# Patient Record
Sex: FEMALE | Race: WHITE | Employment: UNEMPLOYED | URBAN - METROPOLITAN AREA
[De-identification: names, ages, dates, MRNs, and addresses within clinical notes are randomized per-mention and may not be internally consistent; named-entity substitution may affect disease eponyms.]

---

## 2017-02-21 ENCOUNTER — HOSPITAL ENCOUNTER (OUTPATIENT)
Dept: GENERAL RADIOLOGY | Age: 82
Discharge: HOME | End: 2017-02-21

## 2017-02-21 DIAGNOSIS — R10.9 ABDOMINAL PAIN, UNSPECIFIED SITE: ICD-10-CM

## 2018-03-13 ENCOUNTER — HOSPITAL ENCOUNTER (OUTPATIENT)
Dept: MAMMOGRAPHY | Age: 83
Discharge: HOME | End: 2018-03-13
Admitting: INTERNAL MEDICINE

## 2018-03-13 DIAGNOSIS — M81.0 MENOPAUSAL OSTEOPOROSIS: ICD-10-CM

## 2018-08-13 ENCOUNTER — HOSPITAL ENCOUNTER (OUTPATIENT)
Age: 83
Discharge: HOME | End: 2018-08-13
Payer: MEDICARE

## 2018-08-13 ENCOUNTER — HOSPITAL ENCOUNTER (OUTPATIENT)
Dept: GENERAL RADIOLOGY | Age: 83
Discharge: HOME | End: 2018-08-13
Payer: MEDICARE

## 2018-08-13 DIAGNOSIS — M16.0 PRIMARY OSTEOARTHRITIS OF BOTH HIPS: ICD-10-CM

## 2018-08-13 LAB
C-REACTIVE PROTEIN: <0.3 MG/L (ref 0–5.1)
HCT VFR BLD CALC: 40.7 % (ref 36–48)
HEMOGLOBIN: 13.5 G/DL (ref 12–16)
MCH RBC QN AUTO: 33.3 PG (ref 26–34)
MCHC RBC AUTO-ENTMCNC: 33.2 G/DL (ref 31–36)
MCV RBC AUTO: 100.4 FL (ref 80–100)
PDW BLD-RTO: 14.3 % (ref 12.4–15.4)
PLATELET # BLD: 195 K/UL (ref 135–450)
PMV BLD AUTO: 10.6 FL (ref 5–10.5)
RBC # BLD: 4.06 M/UL (ref 4–5.2)
SEDIMENTATION RATE, ERYTHROCYTE: 8 MM/HR (ref 0–30)
WBC # BLD: 6.2 K/UL (ref 4–11)

## 2018-08-13 PROCEDURE — 36415 COLL VENOUS BLD VENIPUNCTURE: CPT

## 2018-08-13 PROCEDURE — 86140 C-REACTIVE PROTEIN: CPT

## 2018-08-13 PROCEDURE — 85652 RBC SED RATE AUTOMATED: CPT

## 2018-08-13 PROCEDURE — 73522 X-RAY EXAM HIPS BI 3-4 VIEWS: CPT

## 2018-08-13 PROCEDURE — 85027 COMPLETE CBC AUTOMATED: CPT

## 2018-11-20 ENCOUNTER — HOSPITAL ENCOUNTER (OUTPATIENT)
Dept: GENERAL RADIOLOGY | Age: 83
Discharge: HOME | End: 2018-11-20
Payer: MEDICARE

## 2018-11-20 ENCOUNTER — HOSPITAL ENCOUNTER (OUTPATIENT)
Age: 83
Discharge: HOME | End: 2018-11-20
Payer: MEDICARE

## 2018-11-20 DIAGNOSIS — K91.81 INTRAOPERATIVE PARTIAL INTESTINAL OBSTRUCTION (HCC): ICD-10-CM

## 2018-11-20 DIAGNOSIS — K56.690 INTRAOPERATIVE PARTIAL INTESTINAL OBSTRUCTION (HCC): ICD-10-CM

## 2018-11-20 LAB
ANION GAP SERPL CALCULATED.3IONS-SCNC: 12 MMOL/L (ref 3–16)
BACTERIA: ABNORMAL /HPF
BASOPHILS ABSOLUTE: 0 K/UL (ref 0–0.2)
BASOPHILS RELATIVE PERCENT: 0.3 %
BILIRUBIN URINE: NEGATIVE
BLOOD, URINE: ABNORMAL
BUN BLDV-MCNC: 15 MG/DL (ref 7–20)
CALCIUM SERPL-MCNC: 9.2 MG/DL (ref 8.3–10.6)
CHLORIDE BLD-SCNC: 103 MMOL/L (ref 99–110)
CLARITY: CLEAR
CO2: 26 MMOL/L (ref 21–32)
COLOR: YELLOW
CREAT SERPL-MCNC: 0.8 MG/DL (ref 0.6–1.2)
EOSINOPHILS ABSOLUTE: 0 K/UL (ref 0–0.6)
EOSINOPHILS RELATIVE PERCENT: 0.1 %
EPITHELIAL CELLS, UA: ABNORMAL /HPF
GFR AFRICAN AMERICAN: >60
GFR NON-AFRICAN AMERICAN: >60
GLUCOSE BLD-MCNC: 124 MG/DL (ref 70–99)
GLUCOSE URINE: NEGATIVE MG/DL
HCT VFR BLD CALC: 41.7 % (ref 36–48)
HEMOGLOBIN: 13.9 G/DL (ref 12–16)
KETONES, URINE: 15 MG/DL
LEUKOCYTE ESTERASE, URINE: NEGATIVE
LYMPHOCYTES ABSOLUTE: 0.8 K/UL (ref 1–5.1)
LYMPHOCYTES RELATIVE PERCENT: 10.4 %
MCH RBC QN AUTO: 33.2 PG (ref 26–34)
MCHC RBC AUTO-ENTMCNC: 33.3 G/DL (ref 31–36)
MCV RBC AUTO: 99.7 FL (ref 80–100)
MICROSCOPIC EXAMINATION: YES
MONOCYTES ABSOLUTE: 0.3 K/UL (ref 0–1.3)
MONOCYTES RELATIVE PERCENT: 4.3 %
MUCUS: ABNORMAL /LPF
NEUTROPHILS ABSOLUTE: 6.4 K/UL (ref 1.7–7.7)
NEUTROPHILS RELATIVE PERCENT: 84.9 %
NITRITE, URINE: NEGATIVE
PDW BLD-RTO: 13.3 % (ref 12.4–15.4)
PH UA: 7
PLATELET # BLD: 223 K/UL (ref 135–450)
PMV BLD AUTO: 10.8 FL (ref 5–10.5)
POTASSIUM SERPL-SCNC: 4.7 MMOL/L (ref 3.5–5.1)
PROTEIN UA: ABNORMAL MG/DL
RBC # BLD: 4.18 M/UL (ref 4–5.2)
RBC UA: ABNORMAL /HPF (ref 0–2)
SODIUM BLD-SCNC: 141 MMOL/L (ref 136–145)
SPECIFIC GRAVITY UA: 1.02
URINE TYPE: ABNORMAL
UROBILINOGEN, URINE: 0.2 E.U./DL
WBC # BLD: 7.6 K/UL (ref 4–11)
WBC UA: ABNORMAL /HPF (ref 0–5)

## 2018-11-20 PROCEDURE — 74018 RADEX ABDOMEN 1 VIEW: CPT

## 2018-11-20 PROCEDURE — 81001 URINALYSIS AUTO W/SCOPE: CPT

## 2018-11-20 PROCEDURE — 80048 BASIC METABOLIC PNL TOTAL CA: CPT

## 2018-11-20 PROCEDURE — 87086 URINE CULTURE/COLONY COUNT: CPT

## 2018-11-20 PROCEDURE — 85025 COMPLETE CBC W/AUTO DIFF WBC: CPT

## 2018-11-20 PROCEDURE — 71046 X-RAY EXAM CHEST 2 VIEWS: CPT

## 2018-11-20 PROCEDURE — 36415 COLL VENOUS BLD VENIPUNCTURE: CPT

## 2018-11-22 LAB — URINE CULTURE, ROUTINE: NORMAL

## 2019-07-25 ENCOUNTER — OFFICE VISIT (OUTPATIENT)
Dept: ORTHOPEDIC SURGERY | Age: 84
End: 2019-07-25
Payer: MEDICARE

## 2019-07-25 DIAGNOSIS — M25.561 RIGHT KNEE PAIN, UNSPECIFIED CHRONICITY: Primary | ICD-10-CM

## 2019-07-25 DIAGNOSIS — S82.034A CLOSED NONDISPLACED TRANSVERSE FRACTURE OF RIGHT PATELLA, INITIAL ENCOUNTER: ICD-10-CM

## 2019-07-25 PROCEDURE — G8427 DOCREV CUR MEDS BY ELIG CLIN: HCPCS | Performed by: ORTHOPAEDIC SURGERY

## 2019-07-25 PROCEDURE — 4040F PNEUMOC VAC/ADMIN/RCVD: CPT | Performed by: ORTHOPAEDIC SURGERY

## 2019-07-25 PROCEDURE — G8421 BMI NOT CALCULATED: HCPCS | Performed by: ORTHOPAEDIC SURGERY

## 2019-07-25 PROCEDURE — 1123F ACP DISCUSS/DSCN MKR DOCD: CPT | Performed by: ORTHOPAEDIC SURGERY

## 2019-07-25 PROCEDURE — 1090F PRES/ABSN URINE INCON ASSESS: CPT | Performed by: ORTHOPAEDIC SURGERY

## 2019-07-25 PROCEDURE — L1832 KO ADJ JNT POS R SUP PRE CST: HCPCS | Performed by: ORTHOPAEDIC SURGERY

## 2019-07-25 PROCEDURE — 1036F TOBACCO NON-USER: CPT | Performed by: ORTHOPAEDIC SURGERY

## 2019-07-25 PROCEDURE — 99203 OFFICE O/P NEW LOW 30 MIN: CPT | Performed by: ORTHOPAEDIC SURGERY

## 2019-07-25 NOTE — PROGRESS NOTES
Fear of current or ex partner: None     Emotionally abused: None     Physically abused: None     Forced sexual activity: None   Other Topics Concern    None   Social History Narrative    None     No current outpatient medications on file. No current facility-administered medications for this visit. No Known Allergies    Review of Systems:  Constitutional: negative  Respiratory: negative  Cardiovascular: negative  Musculoskeletal:negative except for New Patient (R KNEE )    Relevant review of systems reviewed and available in the patient's chart in media tab    General Exam:   Constitutional: Patient is adequately groomed with no evidence of malnutrition  Mental Status: The patient is oriented to time, place and person. The patient's mood and affect are appropriate. Vascular: Examination reveals no swelling or calf tenderness. Peripheral pulses are palpable and 2+. OBJECTIVE:  Vital Signs: There were no vitals filed for this visit. Appearance: alert, well appearing, and in no distress, oriented to person, place, and time and normal appearing weight. Physical exam:   Erythema noted to the anterior aspect of right knee from recent fall, tenderness to palpation over the patella. Patient tolerates range of motion actively 0 to 90 degrees, she is able to perform a straight leg raise, extensor mechanism is intact. Special test are deferred. Patient is ambulating with a walker. Distal neurovascular exam is intact. X-ray: 4 views of the right knee are obtained and reviewed they show nondisplaced transverse patella fracture    Assessment : Right nondisplaced transverse patella fracture    Impression:  Encounter Diagnoses   Name Primary?     Right knee pain, unspecified chronicity Yes    Closed nondisplaced transverse fracture of right patella, initial encounter        Office Procedures:  Orders Placed This Encounter   Procedures    XR KNEE RIGHT (MIN 4 VIEWS)    Shanitag T Scope Knee Brace

## 2019-07-26 ENCOUNTER — TELEPHONE (OUTPATIENT)
Dept: ORTHOPEDIC SURGERY | Age: 84
End: 2019-07-26

## 2019-08-22 ENCOUNTER — OFFICE VISIT (OUTPATIENT)
Dept: ORTHOPEDIC SURGERY | Age: 84
End: 2019-08-22
Payer: MEDICARE

## 2019-08-22 VITALS — BODY MASS INDEX: 21.16 KG/M2 | HEIGHT: 62 IN | WEIGHT: 115 LBS

## 2019-08-22 DIAGNOSIS — M25.561 RIGHT KNEE PAIN, UNSPECIFIED CHRONICITY: Primary | ICD-10-CM

## 2019-08-22 DIAGNOSIS — S82.034A CLOSED NONDISPLACED TRANSVERSE FRACTURE OF RIGHT PATELLA, INITIAL ENCOUNTER: ICD-10-CM

## 2019-08-22 PROCEDURE — 99213 OFFICE O/P EST LOW 20 MIN: CPT | Performed by: ORTHOPAEDIC SURGERY

## 2019-08-22 PROCEDURE — G8427 DOCREV CUR MEDS BY ELIG CLIN: HCPCS | Performed by: ORTHOPAEDIC SURGERY

## 2019-08-22 PROCEDURE — 1090F PRES/ABSN URINE INCON ASSESS: CPT | Performed by: ORTHOPAEDIC SURGERY

## 2019-08-22 PROCEDURE — 1123F ACP DISCUSS/DSCN MKR DOCD: CPT | Performed by: ORTHOPAEDIC SURGERY

## 2019-08-22 PROCEDURE — G8420 CALC BMI NORM PARAMETERS: HCPCS | Performed by: ORTHOPAEDIC SURGERY

## 2019-08-22 PROCEDURE — 1036F TOBACCO NON-USER: CPT | Performed by: ORTHOPAEDIC SURGERY

## 2019-08-22 PROCEDURE — 4040F PNEUMOC VAC/ADMIN/RCVD: CPT | Performed by: ORTHOPAEDIC SURGERY

## 2019-09-19 ENCOUNTER — OFFICE VISIT (OUTPATIENT)
Dept: ORTHOPEDIC SURGERY | Age: 84
End: 2019-09-19
Payer: MEDICARE

## 2019-09-19 VITALS — HEIGHT: 62 IN | WEIGHT: 115.08 LBS | BODY MASS INDEX: 21.18 KG/M2

## 2019-09-19 DIAGNOSIS — M25.561 RIGHT KNEE PAIN, UNSPECIFIED CHRONICITY: Primary | ICD-10-CM

## 2019-09-19 DIAGNOSIS — S82.034A CLOSED NONDISPLACED TRANSVERSE FRACTURE OF RIGHT PATELLA, INITIAL ENCOUNTER: ICD-10-CM

## 2019-09-19 PROCEDURE — 4040F PNEUMOC VAC/ADMIN/RCVD: CPT | Performed by: ORTHOPAEDIC SURGERY

## 2019-09-19 PROCEDURE — 1123F ACP DISCUSS/DSCN MKR DOCD: CPT | Performed by: ORTHOPAEDIC SURGERY

## 2019-09-19 PROCEDURE — G8428 CUR MEDS NOT DOCUMENT: HCPCS | Performed by: ORTHOPAEDIC SURGERY

## 2019-09-19 PROCEDURE — 1036F TOBACCO NON-USER: CPT | Performed by: ORTHOPAEDIC SURGERY

## 2019-09-19 PROCEDURE — 1090F PRES/ABSN URINE INCON ASSESS: CPT | Performed by: ORTHOPAEDIC SURGERY

## 2019-09-19 PROCEDURE — G8420 CALC BMI NORM PARAMETERS: HCPCS | Performed by: ORTHOPAEDIC SURGERY

## 2019-09-19 PROCEDURE — 99213 OFFICE O/P EST LOW 20 MIN: CPT | Performed by: ORTHOPAEDIC SURGERY

## 2020-05-01 ENCOUNTER — APPOINTMENT (OUTPATIENT)
Dept: GENERAL RADIOLOGY | Age: 85
DRG: 057 | End: 2020-05-01
Payer: MEDICARE

## 2020-05-01 ENCOUNTER — HOSPITAL ENCOUNTER (EMERGENCY)
Age: 85
Discharge: NURSING, REHAB OR LONG-TERM CARE | DRG: 057 | End: 2020-05-01
Attending: EMERGENCY MEDICINE
Payer: MEDICARE

## 2020-05-01 VITALS
HEART RATE: 63 BPM | SYSTOLIC BLOOD PRESSURE: 157 MMHG | HEIGHT: 64 IN | WEIGHT: 120 LBS | DIASTOLIC BLOOD PRESSURE: 70 MMHG | RESPIRATION RATE: 16 BRPM | TEMPERATURE: 97.8 F | OXYGEN SATURATION: 96 % | BODY MASS INDEX: 20.49 KG/M2

## 2020-05-01 LAB
A/G RATIO: 1.4 (ref 1.1–2.2)
ACETAMINOPHEN LEVEL: <5 UG/ML (ref 10–30)
ALBUMIN SERPL-MCNC: 3.7 G/DL (ref 3.4–5)
ALP BLD-CCNC: 78 U/L (ref 40–129)
ALT SERPL-CCNC: 18 U/L (ref 10–40)
AMPHETAMINE SCREEN, URINE: NORMAL
ANION GAP SERPL CALCULATED.3IONS-SCNC: 11 MMOL/L (ref 3–16)
AST SERPL-CCNC: 23 U/L (ref 15–37)
BACTERIA: ABNORMAL /HPF
BARBITURATE SCREEN URINE: NORMAL
BASOPHILS ABSOLUTE: 0 K/UL (ref 0–0.2)
BASOPHILS RELATIVE PERCENT: 0.6 %
BENZODIAZEPINE SCREEN, URINE: NORMAL
BILIRUB SERPL-MCNC: <0.2 MG/DL (ref 0–1)
BILIRUBIN URINE: NEGATIVE
BLOOD, URINE: ABNORMAL
BUN BLDV-MCNC: 22 MG/DL (ref 7–20)
CALCIUM SERPL-MCNC: 9.1 MG/DL (ref 8.3–10.6)
CANNABINOID SCREEN URINE: NORMAL
CHLORIDE BLD-SCNC: 103 MMOL/L (ref 99–110)
CLARITY: ABNORMAL
CO2: 23 MMOL/L (ref 21–32)
COCAINE METABOLITE SCREEN URINE: NORMAL
COLOR: YELLOW
CREAT SERPL-MCNC: 1 MG/DL (ref 0.6–1.2)
EOSINOPHILS ABSOLUTE: 0.1 K/UL (ref 0–0.6)
EOSINOPHILS RELATIVE PERCENT: 1.1 %
GFR AFRICAN AMERICAN: >60
GFR NON-AFRICAN AMERICAN: 52
GLOBULIN: 2.6 G/DL
GLUCOSE BLD-MCNC: 99 MG/DL (ref 70–99)
GLUCOSE URINE: NEGATIVE MG/DL
HCT VFR BLD CALC: 36.6 % (ref 36–48)
HEMOGLOBIN: 12.2 G/DL (ref 12–16)
KETONES, URINE: NEGATIVE MG/DL
LEUKOCYTE ESTERASE, URINE: ABNORMAL
LYMPHOCYTES ABSOLUTE: 0.8 K/UL (ref 1–5.1)
LYMPHOCYTES RELATIVE PERCENT: 16.1 %
Lab: NORMAL
MCH RBC QN AUTO: 32.9 PG (ref 26–34)
MCHC RBC AUTO-ENTMCNC: 33.5 G/DL (ref 31–36)
MCV RBC AUTO: 98.4 FL (ref 80–100)
METHADONE SCREEN, URINE: NORMAL
MICROSCOPIC EXAMINATION: YES
MONOCYTES ABSOLUTE: 0.6 K/UL (ref 0–1.3)
MONOCYTES RELATIVE PERCENT: 11.6 %
MUCUS: ABNORMAL /LPF
NEUTROPHILS ABSOLUTE: 3.6 K/UL (ref 1.7–7.7)
NEUTROPHILS RELATIVE PERCENT: 70.6 %
NITRITE, URINE: NEGATIVE
OPIATE SCREEN URINE: NORMAL
OXYCODONE URINE: NORMAL
PDW BLD-RTO: 13.8 % (ref 12.4–15.4)
PH UA: 6
PH UA: 6 (ref 5–8)
PHENCYCLIDINE SCREEN URINE: NORMAL
PLATELET # BLD: 178 K/UL (ref 135–450)
PMV BLD AUTO: 9.6 FL (ref 5–10.5)
POTASSIUM REFLEX MAGNESIUM: 4.1 MMOL/L (ref 3.5–5.1)
PROPOXYPHENE SCREEN: NORMAL
PROTEIN UA: NEGATIVE MG/DL
RBC # BLD: 3.71 M/UL (ref 4–5.2)
RBC UA: ABNORMAL /HPF (ref 0–4)
SALICYLATE, SERUM: <0.3 MG/DL (ref 15–30)
SODIUM BLD-SCNC: 137 MMOL/L (ref 136–145)
SPECIFIC GRAVITY UA: 1.01 (ref 1–1.03)
TOTAL PROTEIN: 6.3 G/DL (ref 6.4–8.2)
URINE TYPE: ABNORMAL
UROBILINOGEN, URINE: 0.2 E.U./DL
WBC # BLD: 5.1 K/UL (ref 4–11)
WBC UA: ABNORMAL /HPF (ref 0–5)

## 2020-05-01 PROCEDURE — 80307 DRUG TEST PRSMV CHEM ANLYZR: CPT

## 2020-05-01 PROCEDURE — 71046 X-RAY EXAM CHEST 2 VIEWS: CPT

## 2020-05-01 PROCEDURE — 81001 URINALYSIS AUTO W/SCOPE: CPT

## 2020-05-01 PROCEDURE — 80053 COMPREHEN METABOLIC PANEL: CPT

## 2020-05-01 PROCEDURE — G0480 DRUG TEST DEF 1-7 CLASSES: HCPCS

## 2020-05-01 PROCEDURE — 2580000003 HC RX 258: Performed by: PHYSICIAN ASSISTANT

## 2020-05-01 PROCEDURE — 87077 CULTURE AEROBIC IDENTIFY: CPT

## 2020-05-01 PROCEDURE — 99285 EMERGENCY DEPT VISIT HI MDM: CPT

## 2020-05-01 PROCEDURE — 87086 URINE CULTURE/COLONY COUNT: CPT

## 2020-05-01 PROCEDURE — 85025 COMPLETE CBC W/AUTO DIFF WBC: CPT

## 2020-05-01 PROCEDURE — 96365 THER/PROPH/DIAG IV INF INIT: CPT

## 2020-05-01 PROCEDURE — 6360000002 HC RX W HCPCS: Performed by: PHYSICIAN ASSISTANT

## 2020-05-01 RX ORDER — RISPERIDONE 0.5 MG/1
0.5 TABLET, FILM COATED ORAL 2 TIMES DAILY
Status: ON HOLD | COMMUNITY
End: 2020-05-15 | Stop reason: SDUPTHER

## 2020-05-01 RX ORDER — IBANDRONATE SODIUM 150 MG/1
150 TABLET, FILM COATED ORAL
COMMUNITY

## 2020-05-01 RX ORDER — CEPHALEXIN 500 MG/1
500 CAPSULE ORAL 2 TIMES DAILY
Qty: 14 CAPSULE | Refills: 0 | Status: ON HOLD | OUTPATIENT
Start: 2020-05-01 | End: 2020-05-15 | Stop reason: HOSPADM

## 2020-05-01 RX ORDER — LORAZEPAM 0.5 MG/1
0.5 TABLET ORAL 2 TIMES DAILY
Status: ON HOLD | COMMUNITY
End: 2020-05-15 | Stop reason: HOSPADM

## 2020-05-01 RX ORDER — LEVOTHYROXINE SODIUM 0.05 MG/1
50 TABLET ORAL DAILY
COMMUNITY

## 2020-05-01 RX ORDER — ESCITALOPRAM OXALATE 10 MG/1
10 TABLET ORAL DAILY
COMMUNITY

## 2020-05-01 RX ORDER — OYSTER SHELL CALCIUM WITH VITAMIN D 500; 200 MG/1; [IU]/1
1 TABLET, FILM COATED ORAL DAILY
COMMUNITY

## 2020-05-01 RX ORDER — SERTRALINE HYDROCHLORIDE 100 MG/1
100 TABLET, FILM COATED ORAL DAILY
Status: ON HOLD | COMMUNITY
End: 2020-05-15 | Stop reason: HOSPADM

## 2020-05-01 RX ORDER — ACETAMINOPHEN 325 MG/1
650 TABLET ORAL EVERY 4 HOURS PRN
COMMUNITY

## 2020-05-01 RX ADMIN — CEFTRIAXONE SODIUM 1 G: 1 INJECTION, POWDER, FOR SOLUTION INTRAMUSCULAR; INTRAVENOUS at 21:18

## 2020-05-02 ENCOUNTER — HOSPITAL ENCOUNTER (INPATIENT)
Age: 85
LOS: 1 days | Discharge: ANOTHER HOSPITAL | DRG: 057 | End: 2020-05-03
Attending: EMERGENCY MEDICINE | Admitting: PSYCHIATRY & NEUROLOGY
Payer: MEDICARE

## 2020-05-02 ENCOUNTER — APPOINTMENT (OUTPATIENT)
Dept: GENERAL RADIOLOGY | Age: 85
DRG: 057 | End: 2020-05-02
Payer: MEDICARE

## 2020-05-02 ENCOUNTER — APPOINTMENT (OUTPATIENT)
Dept: CT IMAGING | Age: 85
DRG: 057 | End: 2020-05-02
Payer: MEDICARE

## 2020-05-02 PROBLEM — F03.91 UNSPECIFIED DEMENTIA WITH BEHAVIORAL DISTURBANCE: Status: ACTIVE | Noted: 2020-05-02

## 2020-05-02 LAB
A/G RATIO: 1.4 (ref 1.1–2.2)
ALBUMIN SERPL-MCNC: 4.1 G/DL (ref 3.4–5)
ALP BLD-CCNC: 74 U/L (ref 40–129)
ALT SERPL-CCNC: 17 U/L (ref 10–40)
AMPHETAMINE SCREEN, URINE: NORMAL
ANION GAP SERPL CALCULATED.3IONS-SCNC: 9 MMOL/L (ref 3–16)
AST SERPL-CCNC: 24 U/L (ref 15–37)
BARBITURATE SCREEN URINE: NORMAL
BASOPHILS ABSOLUTE: 0 K/UL (ref 0–0.2)
BASOPHILS RELATIVE PERCENT: 0.6 %
BENZODIAZEPINE SCREEN, URINE: NORMAL
BILIRUB SERPL-MCNC: 0.4 MG/DL (ref 0–1)
BILIRUBIN URINE: NEGATIVE
BLOOD, URINE: ABNORMAL
BUN BLDV-MCNC: 15 MG/DL (ref 7–20)
CALCIUM SERPL-MCNC: 9.4 MG/DL (ref 8.3–10.6)
CANNABINOID SCREEN URINE: NORMAL
CHLORIDE BLD-SCNC: 103 MMOL/L (ref 99–110)
CLARITY: CLEAR
CO2: 27 MMOL/L (ref 21–32)
COCAINE METABOLITE SCREEN URINE: NORMAL
COLOR: YELLOW
CREAT SERPL-MCNC: 0.9 MG/DL (ref 0.6–1.2)
EKG ATRIAL RATE: 60 BPM
EKG DIAGNOSIS: NORMAL
EKG P AXIS: 70 DEGREES
EKG P-R INTERVAL: 154 MS
EKG Q-T INTERVAL: 404 MS
EKG QRS DURATION: 72 MS
EKG QTC CALCULATION (BAZETT): 404 MS
EKG R AXIS: 33 DEGREES
EKG T AXIS: 71 DEGREES
EKG VENTRICULAR RATE: 60 BPM
EOSINOPHILS ABSOLUTE: 0 K/UL (ref 0–0.6)
EOSINOPHILS RELATIVE PERCENT: 1 %
EPITHELIAL CELLS, UA: ABNORMAL /HPF (ref 0–5)
ETHANOL: NORMAL MG/DL (ref 0–0.08)
GFR AFRICAN AMERICAN: >60
GFR NON-AFRICAN AMERICAN: 59
GLOBULIN: 3 G/DL
GLUCOSE BLD-MCNC: 97 MG/DL (ref 70–99)
GLUCOSE URINE: NEGATIVE MG/DL
HCT VFR BLD CALC: 40.7 % (ref 36–48)
HEMOGLOBIN: 13.5 G/DL (ref 12–16)
KETONES, URINE: NEGATIVE MG/DL
LEUKOCYTE ESTERASE, URINE: NEGATIVE
LYMPHOCYTES ABSOLUTE: 1 K/UL (ref 1–5.1)
LYMPHOCYTES RELATIVE PERCENT: 20.6 %
Lab: NORMAL
MCH RBC QN AUTO: 32.4 PG (ref 26–34)
MCHC RBC AUTO-ENTMCNC: 33.3 G/DL (ref 31–36)
MCV RBC AUTO: 97.2 FL (ref 80–100)
METHADONE SCREEN, URINE: NORMAL
MICROSCOPIC EXAMINATION: YES
MONOCYTES ABSOLUTE: 0.5 K/UL (ref 0–1.3)
MONOCYTES RELATIVE PERCENT: 11.1 %
MUCUS: ABNORMAL /LPF
NEUTROPHILS ABSOLUTE: 3.1 K/UL (ref 1.7–7.7)
NEUTROPHILS RELATIVE PERCENT: 66.7 %
NITRITE, URINE: NEGATIVE
OPIATE SCREEN URINE: NORMAL
OXYCODONE URINE: NORMAL
PDW BLD-RTO: 13.7 % (ref 12.4–15.4)
PH UA: 6
PH UA: 6 (ref 5–8)
PHENCYCLIDINE SCREEN URINE: NORMAL
PLATELET # BLD: 188 K/UL (ref 135–450)
PMV BLD AUTO: 10.2 FL (ref 5–10.5)
POTASSIUM REFLEX MAGNESIUM: 4.2 MMOL/L (ref 3.5–5.1)
PROPOXYPHENE SCREEN: NORMAL
PROTEIN UA: NEGATIVE MG/DL
RBC # BLD: 4.18 M/UL (ref 4–5.2)
RBC UA: ABNORMAL /HPF (ref 0–4)
SODIUM BLD-SCNC: 139 MMOL/L (ref 136–145)
SPECIFIC GRAVITY UA: 1.02 (ref 1–1.03)
T4 FREE: 1.3 NG/DL (ref 0.9–1.8)
TOTAL PROTEIN: 7.1 G/DL (ref 6.4–8.2)
TROPONIN: <0.01 NG/ML
TSH REFLEX: 6.29 UIU/ML (ref 0.27–4.2)
URINE REFLEX TO CULTURE: ABNORMAL
URINE TYPE: ABNORMAL
UROBILINOGEN, URINE: 0.2 E.U./DL
WBC # BLD: 4.7 K/UL (ref 4–11)
WBC UA: ABNORMAL /HPF (ref 0–5)

## 2020-05-02 PROCEDURE — G0480 DRUG TEST DEF 1-7 CLASSES: HCPCS

## 2020-05-02 PROCEDURE — 93005 ELECTROCARDIOGRAM TRACING: CPT | Performed by: EMERGENCY MEDICINE

## 2020-05-02 PROCEDURE — 84443 ASSAY THYROID STIM HORMONE: CPT

## 2020-05-02 PROCEDURE — 6370000000 HC RX 637 (ALT 250 FOR IP): Performed by: INTERNAL MEDICINE

## 2020-05-02 PROCEDURE — 80307 DRUG TEST PRSMV CHEM ANLYZR: CPT

## 2020-05-02 PROCEDURE — 80053 COMPREHEN METABOLIC PANEL: CPT

## 2020-05-02 PROCEDURE — 81001 URINALYSIS AUTO W/SCOPE: CPT

## 2020-05-02 PROCEDURE — 85025 COMPLETE CBC W/AUTO DIFF WBC: CPT

## 2020-05-02 PROCEDURE — 70450 CT HEAD/BRAIN W/O DYE: CPT

## 2020-05-02 PROCEDURE — 6360000002 HC RX W HCPCS: Performed by: EMERGENCY MEDICINE

## 2020-05-02 PROCEDURE — 84484 ASSAY OF TROPONIN QUANT: CPT

## 2020-05-02 PROCEDURE — 99285 EMERGENCY DEPT VISIT HI MDM: CPT

## 2020-05-02 PROCEDURE — 93010 ELECTROCARDIOGRAM REPORT: CPT | Performed by: INTERNAL MEDICINE

## 2020-05-02 PROCEDURE — 84439 ASSAY OF FREE THYROXINE: CPT

## 2020-05-02 PROCEDURE — 1240000000 HC EMOTIONAL WELLNESS R&B

## 2020-05-02 PROCEDURE — 71045 X-RAY EXAM CHEST 1 VIEW: CPT

## 2020-05-02 RX ORDER — HALOPERIDOL 5 MG/ML
5 INJECTION INTRAMUSCULAR ONCE
Status: COMPLETED | OUTPATIENT
Start: 2020-05-02 | End: 2020-05-02

## 2020-05-02 RX ORDER — M-VIT,TX,IRON,MINS/CALC/FOLIC 27MG-0.4MG
1 TABLET ORAL DAILY
Status: DISCONTINUED | OUTPATIENT
Start: 2020-05-03 | End: 2020-05-03 | Stop reason: HOSPADM

## 2020-05-02 RX ORDER — ACETAMINOPHEN 325 MG/1
650 TABLET ORAL EVERY 4 HOURS PRN
Status: DISCONTINUED | OUTPATIENT
Start: 2020-05-02 | End: 2020-05-03 | Stop reason: HOSPADM

## 2020-05-02 RX ORDER — MAGNESIUM HYDROXIDE/ALUMINUM HYDROXICE/SIMETHICONE 120; 1200; 1200 MG/30ML; MG/30ML; MG/30ML
30 SUSPENSION ORAL EVERY 6 HOURS PRN
Status: DISCONTINUED | OUTPATIENT
Start: 2020-05-02 | End: 2020-05-03 | Stop reason: HOSPADM

## 2020-05-02 RX ORDER — HYDRALAZINE HYDROCHLORIDE 25 MG/1
25 TABLET, FILM COATED ORAL EVERY 6 HOURS PRN
Status: DISCONTINUED | OUTPATIENT
Start: 2020-05-02 | End: 2020-05-02

## 2020-05-02 RX ORDER — CLONIDINE HYDROCHLORIDE 0.1 MG/1
0.1 TABLET ORAL EVERY 4 HOURS PRN
Status: DISCONTINUED | OUTPATIENT
Start: 2020-05-02 | End: 2020-05-02

## 2020-05-02 RX ORDER — ESCITALOPRAM OXALATE 10 MG/1
10 TABLET ORAL DAILY
Status: DISCONTINUED | OUTPATIENT
Start: 2020-05-03 | End: 2020-05-03 | Stop reason: HOSPADM

## 2020-05-02 RX ORDER — HALOPERIDOL 5 MG/ML
2 INJECTION INTRAMUSCULAR EVERY 6 HOURS PRN
Status: DISCONTINUED | OUTPATIENT
Start: 2020-05-02 | End: 2020-05-03 | Stop reason: HOSPADM

## 2020-05-02 RX ORDER — RISPERIDONE 0.25 MG/1
0.5 TABLET, FILM COATED ORAL 2 TIMES DAILY
Status: DISCONTINUED | OUTPATIENT
Start: 2020-05-02 | End: 2020-05-03 | Stop reason: HOSPADM

## 2020-05-02 RX ORDER — HALOPERIDOL 1 MG/1
2 TABLET ORAL EVERY 6 HOURS PRN
Status: DISCONTINUED | OUTPATIENT
Start: 2020-05-02 | End: 2020-05-03 | Stop reason: HOSPADM

## 2020-05-02 RX ORDER — LORAZEPAM 2 MG/ML
1 INJECTION INTRAMUSCULAR EVERY 6 HOURS PRN
Status: DISCONTINUED | OUTPATIENT
Start: 2020-05-02 | End: 2020-05-03 | Stop reason: HOSPADM

## 2020-05-02 RX ORDER — TRAZODONE HYDROCHLORIDE 50 MG/1
25 TABLET ORAL NIGHTLY PRN
Status: DISCONTINUED | OUTPATIENT
Start: 2020-05-02 | End: 2020-05-03 | Stop reason: HOSPADM

## 2020-05-02 RX ORDER — LEVOTHYROXINE SODIUM 0.05 MG/1
50 TABLET ORAL DAILY
Status: DISCONTINUED | OUTPATIENT
Start: 2020-05-03 | End: 2020-05-03 | Stop reason: HOSPADM

## 2020-05-02 RX ORDER — DIPHENHYDRAMINE HYDROCHLORIDE 50 MG/ML
25 INJECTION INTRAMUSCULAR; INTRAVENOUS ONCE
Status: COMPLETED | OUTPATIENT
Start: 2020-05-02 | End: 2020-05-02

## 2020-05-02 RX ORDER — MULTIVIT,IRON,MINERALS/LUTEIN
1 TABLET ORAL DAILY
Status: DISCONTINUED | OUTPATIENT
Start: 2020-05-03 | End: 2020-05-02 | Stop reason: CLARIF

## 2020-05-02 RX ORDER — BENZTROPINE MESYLATE 1 MG/ML
1 INJECTION INTRAMUSCULAR; INTRAVENOUS 2 TIMES DAILY PRN
Status: DISCONTINUED | OUTPATIENT
Start: 2020-05-02 | End: 2020-05-03 | Stop reason: HOSPADM

## 2020-05-02 RX ORDER — HYDRALAZINE HYDROCHLORIDE 25 MG/1
25 TABLET, FILM COATED ORAL EVERY 4 HOURS PRN
Status: DISCONTINUED | OUTPATIENT
Start: 2020-05-02 | End: 2020-05-03 | Stop reason: HOSPADM

## 2020-05-02 RX ORDER — LANOLIN ALCOHOL/MO/W.PET/CERES
6 CREAM (GRAM) TOPICAL NIGHTLY PRN
Status: DISCONTINUED | OUTPATIENT
Start: 2020-05-02 | End: 2020-05-03 | Stop reason: HOSPADM

## 2020-05-02 RX ORDER — ACETAMINOPHEN 500 MG
1000 TABLET ORAL DAILY
Status: DISCONTINUED | OUTPATIENT
Start: 2020-05-03 | End: 2020-05-03 | Stop reason: HOSPADM

## 2020-05-02 RX ORDER — LORAZEPAM 2 MG/ML
1 INJECTION INTRAMUSCULAR ONCE
Status: COMPLETED | OUTPATIENT
Start: 2020-05-02 | End: 2020-05-02

## 2020-05-02 RX ORDER — LORAZEPAM 0.5 MG/1
0.5 TABLET ORAL EVERY 6 HOURS PRN
Status: DISCONTINUED | OUTPATIENT
Start: 2020-05-02 | End: 2020-05-03 | Stop reason: HOSPADM

## 2020-05-02 RX ORDER — CEPHALEXIN 500 MG/1
500 CAPSULE ORAL 2 TIMES DAILY
Status: DISCONTINUED | OUTPATIENT
Start: 2020-05-02 | End: 2020-05-03 | Stop reason: HOSPADM

## 2020-05-02 RX ADMIN — LORAZEPAM 1 MG: 2 INJECTION INTRAMUSCULAR; INTRAVENOUS at 17:48

## 2020-05-02 RX ADMIN — DIPHENHYDRAMINE HYDROCHLORIDE 25 MG: 50 INJECTION, SOLUTION INTRAMUSCULAR; INTRAVENOUS at 16:58

## 2020-05-02 RX ADMIN — DIPHENHYDRAMINE HYDROCHLORIDE 25 MG: 50 INJECTION, SOLUTION INTRAMUSCULAR; INTRAVENOUS at 17:48

## 2020-05-02 RX ADMIN — HYDRALAZINE HYDROCHLORIDE 25 MG: 25 TABLET, FILM COATED ORAL at 21:18

## 2020-05-02 RX ADMIN — HALOPERIDOL LACTATE 5 MG: 5 INJECTION, SOLUTION INTRAMUSCULAR at 17:48

## 2020-05-02 RX ADMIN — HALOPERIDOL LACTATE 5 MG: 5 INJECTION, SOLUTION INTRAMUSCULAR at 16:58
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
## 2020-05-02 NOTE — ED NOTES
Per MD George, patient is okay to go to [facility].       David Boogie RN  05/02/20 9403

## 2020-05-02 NOTE — ED NOTES
Report called to Benoit at [facility] [phone]. Patient left ED with EMS in stable condition.       Marylu Bergeron RN  05/01/20 3936
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
## 2020-05-02 NOTE — ED NOTES
Call placed to transfer center to end case. Caty Berman will be keeping the patient.      Houston Prado RN  05/02/20 8732

## 2020-05-02 NOTE — ED NOTES
Call placed to transfer center. Spoke with Abner Garcia. Process to find placement initiated.       Karely Johnson RN  05/02/20 4758

## 2020-05-02 NOTE — ED PROVIDER NOTES
moist.   NECK: Supple. No midline cervical tenderness to palpation, full active range of motion. HEART: RRR. No murmurs. Radial pulses 2+ symmetric, PT pulses 1+ symmetric. No chest wall tenderness or deformity or bruising appreciated  LUNGS: Respirations unlabored. CTAB. Good air exchange. Speaking comfortably in full sentences. ABDOMEN: Soft. Non-distended. Non-tender. No masses. No organomegaly. No guarding or rebound. EXTREMITIES: No peripheral edema. Moves all extremities equally. All extremities neurovascularly intact. SKIN: Warm and dry. No acute rashes. NEUROLOGICAL: Alert and oriented. Cranial nerves II through IX, XI through XII grossly intact. No gross facial drooping. Power intact upper and lower extremity sensation intact x4, normal gait. Normal FMF, no ataxia or tremors appreciated   pSYCHIATRIC: Normal mood and affect. Oriented to self, time.   Is very cooperative and pleasant    DIAGNOSTIC RESULTS   LABS:    Labs Reviewed   CBC WITH AUTO DIFFERENTIAL - Abnormal; Notable for the following components:       Result Value    RBC 3.71 (*)     Lymphocytes Absolute 0.8 (*)     All other components within normal limits    Narrative:     Performed at:  [facility],  [place]   Phone [phone]   COMPREHENSIVE METABOLIC PANEL W/ REFLEX TO MG FOR LOW K - Abnormal; Notable for the following components:    BUN 22 (*)     GFR Non- 52 (*)     Total Protein 6.3 (*)     All other components within normal limits    Narrative:     Performed at:  [facility],  [employer] [facility]   Phone [phone]   URINALYSIS - Abnormal; Notable for the following components:    Clarity, UA SL CLOUDY (*)     Blood, Urine SMALL (*)     Leukocyte Esterase, Urine SMALL (*)     All other components within normal limits    Narrative:     Performed at:  [facility] (Please note that portions ofthis note were completed with a voice recognition program.  Efforts were made to edit the dictations but occasionally words are mis-transcribed.)    Karen Amin, [id number] (electronically signed)        Hernán En[address]  05/02/20 4965

## 2020-05-03 ENCOUNTER — APPOINTMENT (OUTPATIENT)
Dept: CT IMAGING | Age: 85
DRG: 057 | End: 2020-05-03
Payer: MEDICARE

## 2020-05-03 ENCOUNTER — HOSPITAL ENCOUNTER (INPATIENT)
Age: 85
LOS: 2 days | Discharge: OTHER INSTITUTION | DRG: 304 | End: 2020-05-05
Attending: INTERNAL MEDICINE | Admitting: INTERNAL MEDICINE
Payer: MEDICARE

## 2020-05-03 ENCOUNTER — APPOINTMENT (OUTPATIENT)
Dept: GENERAL RADIOLOGY | Age: 85
DRG: 304 | End: 2020-05-03
Attending: INTERNAL MEDICINE
Payer: MEDICARE

## 2020-05-03 VITALS
OXYGEN SATURATION: 95 % | RESPIRATION RATE: 22 BRPM | DIASTOLIC BLOOD PRESSURE: 113 MMHG | SYSTOLIC BLOOD PRESSURE: 202 MMHG | HEART RATE: 136 BPM | BODY MASS INDEX: 21.09 KG/M2 | TEMPERATURE: 97.3 F | HEIGHT: 63 IN | WEIGHT: 119 LBS

## 2020-05-03 PROBLEM — I16.1 HYPERTENSIVE EMERGENCY: Status: ACTIVE | Noted: 2020-05-03

## 2020-05-03 LAB
ANION GAP SERPL CALCULATED.3IONS-SCNC: 18 MMOL/L (ref 3–16)
BASE EXCESS ARTERIAL: -2.2 MMOL/L (ref -3–3)
BUN BLDV-MCNC: 15 MG/DL (ref 7–20)
CALCIUM SERPL-MCNC: 10.1 MG/DL (ref 8.3–10.6)
CARBOXYHEMOGLOBIN ARTERIAL: 0.2 % (ref 0–1.5)
CHLORIDE BLD-SCNC: 98 MMOL/L (ref 99–110)
CO2: 20 MMOL/L (ref 21–32)
CREAT SERPL-MCNC: 0.7 MG/DL (ref 0.6–1.2)
EKG ATRIAL RATE: 138 BPM
EKG DIAGNOSIS: NORMAL
EKG P-R INTERVAL: 116 MS
EKG Q-T INTERVAL: 364 MS
EKG QRS DURATION: 74 MS
EKG QTC CALCULATION (BAZETT): 551 MS
EKG R AXIS: 28 DEGREES
EKG T AXIS: -24 DEGREES
EKG VENTRICULAR RATE: 138 BPM
GFR AFRICAN AMERICAN: >60
GFR NON-AFRICAN AMERICAN: >60
GLUCOSE BLD-MCNC: 140 MG/DL (ref 70–99)
HCO3 ARTERIAL: 21.4 MMOL/L (ref 21–29)
HEMOGLOBIN, ART, EXTENDED: 16.2 G/DL (ref 12–16)
METHEMOGLOBIN ARTERIAL: 0.3 %
O2 CONTENT ARTERIAL: 21 ML/DL
O2 SAT, ARTERIAL: 95.2 %
O2 THERAPY: ABNORMAL
ORGANISM: ABNORMAL
PCO2 ARTERIAL: 34.2 MMHG (ref 35–45)
PH ARTERIAL: 7.42 (ref 7.35–7.45)
PO2 ARTERIAL: 74 MMHG (ref 75–108)
POTASSIUM REFLEX MAGNESIUM: 3.6 MMOL/L (ref 3.5–5.1)
PRO-BNP: 2229 PG/ML (ref 0–449)
PROCALCITONIN: 0.07 NG/ML (ref 0–0.15)
SODIUM BLD-SCNC: 136 MMOL/L (ref 136–145)
TCO2 ARTERIAL: 22.5 MMOL/L
TROPONIN: <0.01 NG/ML
URINE CULTURE, ROUTINE: ABNORMAL
URINE CULTURE, ROUTINE: ABNORMAL

## 2020-05-03 PROCEDURE — 2580000003 HC RX 258: Performed by: INTERNAL MEDICINE

## 2020-05-03 PROCEDURE — 70450 CT HEAD/BRAIN W/O DYE: CPT

## 2020-05-03 PROCEDURE — 2000000000 HC ICU R&B

## 2020-05-03 PROCEDURE — 93010 ELECTROCARDIOGRAM REPORT: CPT | Performed by: INTERNAL MEDICINE

## 2020-05-03 PROCEDURE — 1800000000 HC LEAVE OF ABSENCE

## 2020-05-03 PROCEDURE — 2500000003 HC RX 250 WO HCPCS: Performed by: INTERNAL MEDICINE

## 2020-05-03 PROCEDURE — 84484 ASSAY OF TROPONIN QUANT: CPT

## 2020-05-03 PROCEDURE — 80048 BASIC METABOLIC PNL TOTAL CA: CPT

## 2020-05-03 PROCEDURE — 36415 COLL VENOUS BLD VENIPUNCTURE: CPT

## 2020-05-03 PROCEDURE — 36600 WITHDRAWAL OF ARTERIAL BLOOD: CPT

## 2020-05-03 PROCEDURE — 6370000000 HC RX 637 (ALT 250 FOR IP): Performed by: PHYSICIAN ASSISTANT

## 2020-05-03 PROCEDURE — 82803 BLOOD GASES ANY COMBINATION: CPT

## 2020-05-03 PROCEDURE — 99239 HOSP IP/OBS DSCHRG MGMT >30: CPT | Performed by: PSYCHIATRY & NEUROLOGY

## 2020-05-03 PROCEDURE — 84145 PROCALCITONIN (PCT): CPT

## 2020-05-03 PROCEDURE — 93005 ELECTROCARDIOGRAM TRACING: CPT | Performed by: PSYCHIATRY & NEUROLOGY

## 2020-05-03 PROCEDURE — 2580000003 HC RX 258: Performed by: PHYSICIAN ASSISTANT

## 2020-05-03 PROCEDURE — 83880 ASSAY OF NATRIURETIC PEPTIDE: CPT

## 2020-05-03 PROCEDURE — 6360000002 HC RX W HCPCS: Performed by: INTERNAL MEDICINE

## 2020-05-03 PROCEDURE — 71045 X-RAY EXAM CHEST 1 VIEW: CPT

## 2020-05-03 PROCEDURE — 93005 ELECTROCARDIOGRAM TRACING: CPT | Performed by: PHYSICIAN ASSISTANT

## 2020-05-03 RX ORDER — SODIUM CHLORIDE 0.9 % (FLUSH) 0.9 %
10 SYRINGE (ML) INJECTION PRN
Status: CANCELLED | OUTPATIENT
Start: 2020-05-03

## 2020-05-03 RX ORDER — ONDANSETRON 2 MG/ML
4 INJECTION INTRAMUSCULAR; INTRAVENOUS EVERY 6 HOURS PRN
Status: DISCONTINUED | OUTPATIENT
Start: 2020-05-03 | End: 2020-05-05 | Stop reason: HOSPADM

## 2020-05-03 RX ORDER — SODIUM CHLORIDE 0.9 % (FLUSH) 0.9 %
10 SYRINGE (ML) INJECTION EVERY 12 HOURS SCHEDULED
Status: CANCELLED | OUTPATIENT
Start: 2020-05-03

## 2020-05-03 RX ORDER — ACETAMINOPHEN 650 MG/1
650 SUPPOSITORY RECTAL EVERY 6 HOURS PRN
Status: CANCELLED | OUTPATIENT
Start: 2020-05-03

## 2020-05-03 RX ORDER — PROMETHAZINE HYDROCHLORIDE 25 MG/1
12.5 TABLET ORAL EVERY 6 HOURS PRN
Status: DISCONTINUED | OUTPATIENT
Start: 2020-05-03 | End: 2020-05-05 | Stop reason: HOSPADM

## 2020-05-03 RX ORDER — POLYETHYLENE GLYCOL 3350 17 G/17G
17 POWDER, FOR SOLUTION ORAL DAILY PRN
Status: DISCONTINUED | OUTPATIENT
Start: 2020-05-03 | End: 2020-05-05 | Stop reason: HOSPADM

## 2020-05-03 RX ORDER — LEVOTHYROXINE SODIUM 0.05 MG/1
50 TABLET ORAL DAILY
Status: CANCELLED | OUTPATIENT
Start: 2020-05-04

## 2020-05-03 RX ORDER — ACETAMINOPHEN 650 MG/1
650 SUPPOSITORY RECTAL EVERY 6 HOURS PRN
Status: DISCONTINUED | OUTPATIENT
Start: 2020-05-03 | End: 2020-05-05 | Stop reason: HOSPADM

## 2020-05-03 RX ORDER — ESCITALOPRAM OXALATE 10 MG/1
10 TABLET ORAL DAILY
Status: DISCONTINUED | OUTPATIENT
Start: 2020-05-04 | End: 2020-05-05 | Stop reason: HOSPADM

## 2020-05-03 RX ORDER — LEVOTHYROXINE SODIUM 0.05 MG/1
50 TABLET ORAL DAILY
Status: DISCONTINUED | OUTPATIENT
Start: 2020-05-04 | End: 2020-05-05 | Stop reason: HOSPADM

## 2020-05-03 RX ORDER — AMLODIPINE BESYLATE 5 MG/1
5 TABLET ORAL DAILY
Status: DISCONTINUED | OUTPATIENT
Start: 2020-05-03 | End: 2020-05-03 | Stop reason: HOSPADM

## 2020-05-03 RX ORDER — METOPROLOL TARTRATE 5 MG/5ML
2.5 INJECTION INTRAVENOUS EVERY 6 HOURS
Status: CANCELLED | OUTPATIENT
Start: 2020-05-03

## 2020-05-03 RX ORDER — BENZTROPINE MESYLATE 1 MG/ML
1 INJECTION INTRAMUSCULAR; INTRAVENOUS 2 TIMES DAILY PRN
Status: CANCELLED | OUTPATIENT
Start: 2020-05-03

## 2020-05-03 RX ORDER — ACETAMINOPHEN 650 MG/1
650 SUPPOSITORY RECTAL EVERY 6 HOURS PRN
Status: DISCONTINUED | OUTPATIENT
Start: 2020-05-03 | End: 2020-05-04 | Stop reason: SDUPTHER

## 2020-05-03 RX ORDER — SODIUM CHLORIDE 0.9 % (FLUSH) 0.9 %
10 SYRINGE (ML) INJECTION PRN
Status: DISCONTINUED | OUTPATIENT
Start: 2020-05-03 | End: 2020-05-05 | Stop reason: HOSPADM

## 2020-05-03 RX ORDER — ACETAMINOPHEN 325 MG/1
650 TABLET ORAL EVERY 6 HOURS PRN
Status: CANCELLED | OUTPATIENT
Start: 2020-05-03

## 2020-05-03 RX ORDER — METOPROLOL TARTRATE 5 MG/5ML
2.5 INJECTION INTRAVENOUS EVERY 6 HOURS
Status: DISCONTINUED | OUTPATIENT
Start: 2020-05-03 | End: 2020-05-05

## 2020-05-03 RX ORDER — LANOLIN ALCOHOL/MO/W.PET/CERES
6 CREAM (GRAM) TOPICAL NIGHTLY PRN
Status: DISCONTINUED | OUTPATIENT
Start: 2020-05-03 | End: 2020-05-05 | Stop reason: HOSPADM

## 2020-05-03 RX ORDER — ESCITALOPRAM OXALATE 10 MG/1
10 TABLET ORAL DAILY
Status: CANCELLED | OUTPATIENT
Start: 2020-05-04

## 2020-05-03 RX ORDER — SODIUM CHLORIDE 0.9 % (FLUSH) 0.9 %
10 SYRINGE (ML) INJECTION EVERY 12 HOURS SCHEDULED
Status: DISCONTINUED | OUTPATIENT
Start: 2020-05-03 | End: 2020-05-05 | Stop reason: HOSPADM

## 2020-05-03 RX ORDER — BENZTROPINE MESYLATE 1 MG/ML
1 INJECTION INTRAMUSCULAR; INTRAVENOUS 2 TIMES DAILY PRN
Status: DISCONTINUED | OUTPATIENT
Start: 2020-05-03 | End: 2020-05-05 | Stop reason: HOSPADM

## 2020-05-03 RX ORDER — LANOLIN ALCOHOL/MO/W.PET/CERES
6 CREAM (GRAM) TOPICAL NIGHTLY PRN
Status: CANCELLED | OUTPATIENT
Start: 2020-05-03

## 2020-05-03 RX ORDER — ACETAMINOPHEN 325 MG/1
650 TABLET ORAL EVERY 6 HOURS PRN
Status: DISCONTINUED | OUTPATIENT
Start: 2020-05-03 | End: 2020-05-05 | Stop reason: HOSPADM

## 2020-05-03 RX ORDER — ACETAMINOPHEN 325 MG/1
650 TABLET ORAL EVERY 6 HOURS PRN
Status: DISCONTINUED | OUTPATIENT
Start: 2020-05-03 | End: 2020-05-04 | Stop reason: SDUPTHER

## 2020-05-03 RX ORDER — SODIUM CHLORIDE 9 MG/ML
INJECTION, SOLUTION INTRAVENOUS CONTINUOUS
Status: CANCELLED | OUTPATIENT
Start: 2020-05-03

## 2020-05-03 RX ORDER — SODIUM CHLORIDE 9 MG/ML
INJECTION, SOLUTION INTRAVENOUS CONTINUOUS
Status: DISCONTINUED | OUTPATIENT
Start: 2020-05-03 | End: 2020-05-04

## 2020-05-03 RX ADMIN — Medication 10 ML: at 20:54

## 2020-05-03 RX ADMIN — DEXTROSE MONOHYDRATE 5 MG/HR: 50 INJECTION, SOLUTION INTRAVENOUS at 13:08

## 2020-05-03 RX ADMIN — Medication 10 ML: at 20:55

## 2020-05-03 RX ADMIN — METOPROLOL TARTRATE 2.5 MG: 5 INJECTION, SOLUTION INTRAVENOUS at 18:21

## 2020-05-03 RX ADMIN — METOPROLOL TARTRATE 2.5 MG: 5 INJECTION, SOLUTION INTRAVENOUS at 12:27

## 2020-05-03 RX ADMIN — ENOXAPARIN SODIUM 40 MG: 40 INJECTION SUBCUTANEOUS at 15:53

## 2020-05-03 RX ADMIN — SODIUM CHLORIDE: 9 INJECTION, SOLUTION INTRAVENOUS at 12:27

## 2020-05-03 ASSESSMENT — PAIN SCALES - GENERAL: PAINLEVEL_OUTOF10: 0

## 2020-05-03 ASSESSMENT — LIFESTYLE VARIABLES: HISTORY_ALCOHOL_USE: NO
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
## 2020-05-03 NOTE — PROGRESS NOTES
Report given to [facility]. Care transferred at this time.
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
## 2020-05-03 NOTE — PROGRESS NOTES
Patients vitals at 11am- BP was 202/113, , resps 22, O2 95%. Patient stated she needed to go to the bathroom. Patient used restroom, patient stood up to be cleaned and became unresponsive and fell into the bed. Called EKG to do scheduled EKG at 1107, as previous heart rates have been in the 80's. Clinical was called at 1109 to lay eyes on patient, Dr. Carlo Espinal called at  for orders, stat head CT placed. Dr. Piyush Suh notified at 21 [phone] about patients condition, and to have code status changed. Meghana BERGMAN placed lab orders at 1123. 20 g IV placed by Clinical  in [facility]. EKG showed Sinus tach, with new T-wave inversions, and QTc at 551. Patient left unit to go to CT at 1145, and then transferred to ICU. Belongings were sent to ICU and placed in patients room. Son Dwight Ang notified at [phone] that patient was transferred and given the phone number to ICU to get more details.

## 2020-05-03 NOTE — BH NOTE
Patient is not able to demonstrate the ability to move from a reclining position to an upright position within the recliner due to confusion.

## 2020-05-03 NOTE — PROGRESS NOTES
Follow-up    Results: Pt in ICU. SR with frequent bursts of SVT, rate 150's. Pt. Offers minimal verbal to staff. EKG showed ST with t-wave inversion. B/p remains 457'W to 840 systolically. IV cardene gtt ordered. Troponin < 0.01; BNP 2229; CBC pending; cxray -- no acute changes    Results for Bernard Winn (MRN [id number]) as of 5/3/2020 13:14   Ref. Range 5/3/2020 11:30   Sodium Latest Ref Range: 136 - 145 mmol/L 136   Potassium Latest Ref Range: 3.5 - 5.1 mmol/L 3.6   Chloride Latest Ref Range: 99 - 110 mmol/L 98 (L)   CO2 Latest Ref Range: 21 - 32 mmol/L 20 (L)   BUN Latest Ref Range: 7 - 20 mg/dL 15   Creatinine Latest Ref Range: 0.6 - 1.2 mg/dL 0.7   Anion Gap Latest Ref Range: 3 - 16  18 (H)   GFR Non- Latest Ref Range: >60  >60   GFR  Latest Ref Range: >60  >60   Glucose Latest Ref Range: 70 - 99 mg/dL 140 (H)   Calcium Latest Ref Range: 8.3 - 10.6 mg/dL 10.1   Procalcitonin Latest Ref Range: 0.00 - 0.15 ng/mL 0.07       Results for Bernard Winn (MRN [id number]) as of 5/3/2020 13:14   Ref. Range 5/3/2020 11:30   Hemoglobin, Art, Extended Latest Ref Range: 12.0 - 16.0 g/dL 16.2 (H)   pH, Arterial Latest Ref Range: 7.350 - 7.450  7.415   pCO2, Arterial Latest Ref Range: 35.0 - 45.0 mmHg 34.2 (L)   pO2, Arterial Latest Ref Range: 75.0 - 108.0 mmHg 74.0 (L)   HCO3, Arterial Latest Ref Range: 21.0 - 29.0 mmol/L 21.4   TCO2 (calc), Art Latest Ref Range: Not Established mmol/L 22.5   Base Excess, Arterial Latest Ref Range: -3.0 - 3.0 mmol/L -2.2   O2 Sat, Arterial Latest Ref Range: >92 % 95.2   O2 Content, Arterial Latest Ref Range: Not Established mL/dL 21   Methemoglobin, Arterial Latest Ref Range: <1.5 % 0.3   Carboxyhgb, Arterial Latest Ref Range: 0.0 - 1.5 % 0.2       Rapid Response Team  12-24 Hour Follow-up    Pt resting on RA. Remains on Cardene gtt at 1mg/ml. /60. No complaints. Continue POC.

## 2020-05-03 NOTE — FLOWSHEET NOTE
05/03/20 1005   Activities of Daily Living   Patient Requires assistance with daily self-care activities? Yes   Patient identified needing assistance with: Housecleaning;Transportation;Laundry; Food Preparation;Meal Planning;Money Management;Grocery Shopping   Person Assisting  or Other professional caregiver   Person Assisting details Per chart review, pt currently lives at . [address] Per collateral call completed by CHELA, pt's POA reported she enjoys Bingo and activities at [facility]   ([place] due to pt resting and current cognitive status. )   Last time   (PATRICK due to pt resting and current cognitive status.)   Barriers to participating    (PATRICK due to pt resting and current cognitive status.)   Leisure Activity 2   Favorite Leisure Activities  PATRICK due to pt resting and current cognitive status. Frequency    (PATRICK due to pt resting and current cognitive status.)   Last time    (PATRICK due to pt resting and current cognitive status.)   Barriers to participating    (PATRICK due to pt resting and current cognitive status.)   Leisure Activity 3   Favorite Leisure Activities  PATRICK due to pt resting and current cognitive status.    Frequency    (PATRICK due to pt resting and current cognitive status.)   Last time    (PATRICK due to pt resting and current cognitive status.)   Barriers to participating    (PATRICK due to pt resting and current cognitive status.)   Social   Patient reports spending the majority of their free time   (PATRICK due to pt resting and current cognitive status.)   Patient verbalizes a preference for spending free time   (PATRICK due to pt resting and current cognitive status.)   Patients perception of support system   (PATRICK due to pt resting and current cognitive status.)   Patients perception of barriers to socializing with others include(s)   (PATRICK due to pt resting and current cognitive status.)   Social Details Per collateral call,

## 2020-05-04 PROBLEM — E03.9 ACQUIRED HYPOTHYROIDISM: Status: ACTIVE | Noted: 2020-05-04

## 2020-05-04 LAB
ANION GAP SERPL CALCULATED.3IONS-SCNC: 13 MMOL/L (ref 3–16)
BASOPHILS ABSOLUTE: 0 K/UL (ref 0–0.2)
BASOPHILS RELATIVE PERCENT: 0.5 %
BUN BLDV-MCNC: 18 MG/DL (ref 7–20)
CALCIUM SERPL-MCNC: 8.5 MG/DL (ref 8.3–10.6)
CHLORIDE BLD-SCNC: 104 MMOL/L (ref 99–110)
CO2: 21 MMOL/L (ref 21–32)
CREAT SERPL-MCNC: 0.6 MG/DL (ref 0.6–1.2)
EKG ATRIAL RATE: 64 BPM
EKG DIAGNOSIS: NORMAL
EKG P AXIS: 62 DEGREES
EKG P-R INTERVAL: 144 MS
EKG Q-T INTERVAL: 432 MS
EKG QRS DURATION: 70 MS
EKG QTC CALCULATION (BAZETT): 445 MS
EKG R AXIS: 26 DEGREES
EKG T AXIS: 72 DEGREES
EKG VENTRICULAR RATE: 64 BPM
EOSINOPHILS ABSOLUTE: 0 K/UL (ref 0–0.6)
EOSINOPHILS RELATIVE PERCENT: 0.2 %
GFR AFRICAN AMERICAN: >60
GFR NON-AFRICAN AMERICAN: >60
GLUCOSE BLD-MCNC: 90 MG/DL (ref 70–99)
HCT VFR BLD CALC: 43.1 % (ref 36–48)
HEMOGLOBIN: 14.5 G/DL (ref 12–16)
LYMPHOCYTES ABSOLUTE: 1 K/UL (ref 1–5.1)
LYMPHOCYTES RELATIVE PERCENT: 11.3 %
MCH RBC QN AUTO: 33 PG (ref 26–34)
MCHC RBC AUTO-ENTMCNC: 33.6 G/DL (ref 31–36)
MCV RBC AUTO: 98.4 FL (ref 80–100)
MONOCYTES ABSOLUTE: 1 K/UL (ref 0–1.3)
MONOCYTES RELATIVE PERCENT: 10.6 %
NEUTROPHILS ABSOLUTE: 7.1 K/UL (ref 1.7–7.7)
NEUTROPHILS RELATIVE PERCENT: 77.4 %
PDW BLD-RTO: 14.1 % (ref 12.4–15.4)
PLATELET # BLD: 218 K/UL (ref 135–450)
PMV BLD AUTO: 9.9 FL (ref 5–10.5)
POTASSIUM REFLEX MAGNESIUM: 4 MMOL/L (ref 3.5–5.1)
RBC # BLD: 4.38 M/UL (ref 4–5.2)
SODIUM BLD-SCNC: 138 MMOL/L (ref 136–145)
WBC # BLD: 9.2 K/UL (ref 4–11)

## 2020-05-04 PROCEDURE — 6360000002 HC RX W HCPCS: Performed by: INTERNAL MEDICINE

## 2020-05-04 PROCEDURE — 6370000000 HC RX 637 (ALT 250 FOR IP): Performed by: INTERNAL MEDICINE

## 2020-05-04 PROCEDURE — 36415 COLL VENOUS BLD VENIPUNCTURE: CPT

## 2020-05-04 PROCEDURE — 2580000003 HC RX 258: Performed by: INTERNAL MEDICINE

## 2020-05-04 PROCEDURE — 2500000003 HC RX 250 WO HCPCS: Performed by: INTERNAL MEDICINE

## 2020-05-04 PROCEDURE — 80061 LIPID PANEL: CPT

## 2020-05-04 PROCEDURE — 2060000000 HC ICU INTERMEDIATE R&B

## 2020-05-04 PROCEDURE — 80048 BASIC METABOLIC PNL TOTAL CA: CPT

## 2020-05-04 PROCEDURE — 6370000000 HC RX 637 (ALT 250 FOR IP): Performed by: PHYSICIAN ASSISTANT

## 2020-05-04 PROCEDURE — 2580000003 HC RX 258: Performed by: PHYSICIAN ASSISTANT

## 2020-05-04 PROCEDURE — 93010 ELECTROCARDIOGRAM REPORT: CPT | Performed by: INTERNAL MEDICINE

## 2020-05-04 PROCEDURE — 99233 SBSQ HOSP IP/OBS HIGH 50: CPT | Performed by: INTERNAL MEDICINE

## 2020-05-04 PROCEDURE — 1800000000 HC LEAVE OF ABSENCE

## 2020-05-04 PROCEDURE — 85025 COMPLETE CBC W/AUTO DIFF WBC: CPT

## 2020-05-04 PROCEDURE — 99223 1ST HOSP IP/OBS HIGH 75: CPT | Performed by: INTERNAL MEDICINE

## 2020-05-04 PROCEDURE — 83036 HEMOGLOBIN GLYCOSYLATED A1C: CPT

## 2020-05-04 RX ORDER — AMLODIPINE BESYLATE 5 MG/1
5 TABLET ORAL DAILY
Status: DISCONTINUED | OUTPATIENT
Start: 2020-05-04 | End: 2020-05-05

## 2020-05-04 RX ADMIN — Medication 10 ML: at 19:58

## 2020-05-04 RX ADMIN — ENOXAPARIN SODIUM 40 MG: 40 INJECTION SUBCUTANEOUS at 09:29

## 2020-05-04 RX ADMIN — METOPROLOL TARTRATE 2.5 MG: 5 INJECTION, SOLUTION INTRAVENOUS at 18:38

## 2020-05-04 RX ADMIN — SODIUM CHLORIDE: 9 INJECTION, SOLUTION INTRAVENOUS at 01:21

## 2020-05-04 RX ADMIN — BENZTROPINE MESYLATE 1 MG: 1 INJECTION INTRAMUSCULAR; INTRAVENOUS at 20:19

## 2020-05-04 RX ADMIN — Medication 10 ML: at 08:13

## 2020-05-04 RX ADMIN — METOPROLOL TARTRATE 2.5 MG: 5 INJECTION, SOLUTION INTRAVENOUS at 06:11

## 2020-05-04 RX ADMIN — METOPROLOL TARTRATE 2.5 MG: 5 INJECTION, SOLUTION INTRAVENOUS at 01:18

## 2020-05-04 RX ADMIN — LEVOTHYROXINE SODIUM 50 MCG: 50 TABLET ORAL at 06:12

## 2020-05-04 RX ADMIN — METOPROLOL TARTRATE 2.5 MG: 5 INJECTION, SOLUTION INTRAVENOUS at 12:45

## 2020-05-04 RX ADMIN — AMLODIPINE BESYLATE 5 MG: 5 TABLET ORAL at 09:29

## 2020-05-04 RX ADMIN — ESCITALOPRAM OXALATE 10 MG: 10 TABLET ORAL at 09:29

## 2020-05-04 ASSESSMENT — PAIN SCALES - GENERAL: PAINLEVEL_OUTOF10: 0

## 2020-05-04 NOTE — CARE COORDINATION
Case Management Assessment  Initial Evaluation    Date/Time of Evaluation: 5/4/2020 11:46 AM  Assessment Completed by: Brennan Rangel    Patient Name: Wendy Subramanian  YOB: 1929  Diagnosis: Hypertensive emergency [I16.1]  Date / Time: 5/3/2020 12:08 PM  Admission status/Date:5/3/2020 inpt  Chart Reviewed: Yes      Patient Interviewed: No dementia  Family Interviewed:  Yes - son Ida Choudhury via telephone      Hospitalization in the last 30 days:  No    Contacts  :     Relationship to Patient:   Phone Number:    Alternate Contact:     Relationship to Patient:     Phone Number:    Met with:    Current PCP  [address] required for SNF : Y, N        3 night stay required - Y, N    ADLS  Support Systems:    Transportation: EMS transportation    Meal Preparation: facility    [facility]  [facility]  Plans to Return to Present Housing: Yes  Other Identified Issues:     [address]  Currently active with [address] : No     Passport/Waiver : No  :                      Phone Number:    Passport/Waiver Services:           Durable Medical Equipment   DME Provider: ECF  Equipment: Walker_x__Cane___RTS___ BSC___Shower Chair___  02__ at ____Liter(s)---Uses________HHN___ CPAP___ BiPap___ Hospital Bed___W/C____Other________      Has Home O2 in place on admit:  No  Informed of need to bring portable home O2 tank on day of discharge for nursing to connect prior to leaving:   Not Indicated  Verbalized agreement/Understanding:   Not Indicated    Community Service Affiliation  Dialysis:  No    · Name:  · Location  · Dialysis Schedule:  · Phone:   · Fax: Outpatient PT/OT: No    Cancer Center: No     CHF Clinic: No     Pulmonary Rehab: No  Pain Clinic: No  Community Mental Health: No    Wound Clinic: No     COVID SCREENING:  No       Other:      The Plan for Transition of Care is related to the following treatment

## 2020-05-04 NOTE — PROGRESS NOTES
Internal Medicine ICU Progress Note      Events of Last 24 hours:     Patient was transferred from geriatric psych to the medical floor for hypertensive urgency. She is started on a Cardene drip. She was originally admitted to geriatric psych for issues with her behavior. She has a history of Alzheimer's dementia with behavioral disturbance. This morning her blood pressures improved. She is on a low-dose of Cardene. She appears calm. She is oriented to the fact that she is in the hospital.  She is not oriented to person or time. She denies any chest pain. No shortness of breath. She denies any history of hypertension. Review of her medications show she is not on any blood pressure medications. Invasive Lines: PIV      MV:  N/a      Recent Labs     20  1130   PHART 7.415   PCO7OTO 34.2*   PO2ART 74.0*       MV Settings:     / / /     IV:      Vitals:  Temp  Av °F (36.7 °C)  Min: 97.3 °F (36.3 °C)  Max: 98.7 °F (37.1 °C)  Pulse  Av.1  Min: 58  Max: 91  BP  Min: 124/96  Max: 183/85  SpO2  Av.9 %  Min: 87 %  Max: 98 %  Patient Vitals for the past 4 hrs:   BP Temp Temp src Pulse Resp SpO2   20 0800 (!) 160/68 -- -- 58 20 95 %   20 0700 (!) 163/71 97.9 °F (36.6 °C) Oral 67 20 93 %   20 0600 (!) 158/65 -- -- 63 20 95 %   20 0500 (!) 124/96 98.3 °F (36.8 °C) Axillary 66 18 (!) 87 %       CVP:        Intake/Output Summary (Last 24 hours) at 2020 0814  Last data filed at 2020 0600  Gross per 24 hour   Intake 1716 ml   Output 475 ml   Net 1241 ml       EXAM:  General: No distress. Alert. Eyes: PERRL. No sclera icterus. No conjunctiva injected. ENT: No discharge. Pharynx clear. Neck: Trachea midline. Normal thyroid. Resp: No accessory muscle use. No crackles. No wheezing. No rhonchi. No dullness on percussion. CV: Regular rate. Regular rhythm. No mumur or rub. No edema. No JVD. Palpable pedal pulses. GI: Non-tender. Non-distended. No masses.  No organmegaly. Normal bowel sounds. No hernia. Skin: Warm and dry. No nodule on exposed extremities. No rash on exposed extremities. Lymph: No cervical LAD. No supraclavicular LAD. M/S: No cyanosis. No joint deformity. No clubbing. Neuro: Awake. Follows commands. Positive pupils/gag/corneals. Normal pain response. Psych: Oriented to place. No anxiety or agitation. Medications:  Scheduled Meds:   mupirocin   Nasal BID    amLODIPine  5 mg Oral Daily    metoprolol  2.5 mg Intravenous Q6H    sodium chloride flush  10 mL Intravenous 2 times per day    escitalopram  10 mg Oral Daily    levothyroxine  50 mcg Oral Daily    sodium chloride flush  10 mL Intravenous 2 times per day    enoxaparin  40 mg Subcutaneous Daily       PRN Meds:  acetaminophen **OR** acetaminophen, sodium chloride flush, benztropine mesylate, melatonin, sodium chloride flush, polyethylene glycol, promethazine **OR** ondansetron    Results:  CBC:   Recent Labs     05/01/20  1950 05/02/20  1005 05/04/20  0411   WBC 5.1 4.7 9.2   HGB 12.2 13.5 14.5   HCT 36.6 40.7 43.1   MCV 98.4 97.2 98.4    188 218     BMP:   Recent Labs     05/02/20  1005 05/03/20  1130 05/04/20  0411    136 138   K 4.2 3.6 4.0    98* 104   CO2 27 20* 21   BUN 15 15 18   CREATININE 0.9 0.7 0.6     LIVER PROFILE:   Recent Labs     05/01/20  1950 05/02/20  1005   AST 23 24   ALT 18 17   BILITOT <0.2 0.4   ALKPHOS 78 74     PT/INR: No results for input(s): PROTIME, INR in the last 72 hours. APTT: No results for input(s): APTT in the last 72 hours.   UA:  Recent Labs     05/01/20  2026 05/02/20  1010   COLORU Yellow Yellow   PHUR 6.0  6.0 6.0  6.0   WBCUA 3-5 None seen   RBCUA 3-4 3-4   MUCUS 3+* 2+*   BACTERIA 3+*  --    CLARITYU SL CLOUDY* Clear   SPECGRAV 1.015 1.020   LEUKOCYTESUR SMALL* Negative   UROBILINOGEN 0.2 0.2   BILIRUBINUR Negative Negative   BLOODU SMALL* TRACE-INTACT*   GLUCOSEU Negative Negative       Cultures:  pending    Films:    XR

## 2020-05-04 NOTE — PROGRESS NOTES
Pt speaking to son Ramila Ashford on portable phone. Pt is confused as to where she is and why she is here. Pt able to carry on reasonable conversation with her son.

## 2020-05-04 NOTE — PROGRESS NOTES
Pt slept from 12mn- 6 am. Awakened for meds and returned to sleep. Cardene decreased to 0.5 mg at 5 am. Report to Shanta DE GUZMAN.

## 2020-05-05 ENCOUNTER — HOSPITAL ENCOUNTER (INPATIENT)
Age: 85
LOS: 10 days | Discharge: NURSING, REHAB OR LONG-TERM CARE | DRG: 057 | End: 2020-05-15
Attending: PSYCHIATRY & NEUROLOGY | Admitting: PSYCHIATRY & NEUROLOGY
Payer: MEDICARE

## 2020-05-05 VITALS
RESPIRATION RATE: 26 BRPM | TEMPERATURE: 97.8 F | OXYGEN SATURATION: 95 % | DIASTOLIC BLOOD PRESSURE: 74 MMHG | HEART RATE: 85 BPM | SYSTOLIC BLOOD PRESSURE: 170 MMHG

## 2020-05-05 PROCEDURE — 99239 HOSP IP/OBS DSCHRG MGMT >30: CPT | Performed by: INTERNAL MEDICINE

## 2020-05-05 PROCEDURE — 99222 1ST HOSP IP/OBS MODERATE 55: CPT | Performed by: PSYCHIATRY & NEUROLOGY

## 2020-05-05 PROCEDURE — 99233 SBSQ HOSP IP/OBS HIGH 50: CPT | Performed by: INTERNAL MEDICINE

## 2020-05-05 PROCEDURE — 6360000002 HC RX W HCPCS: Performed by: INTERNAL MEDICINE

## 2020-05-05 PROCEDURE — 6370000000 HC RX 637 (ALT 250 FOR IP): Performed by: INTERNAL MEDICINE

## 2020-05-05 PROCEDURE — 2500000003 HC RX 250 WO HCPCS: Performed by: INTERNAL MEDICINE

## 2020-05-05 PROCEDURE — 6370000000 HC RX 637 (ALT 250 FOR IP): Performed by: NURSE PRACTITIONER

## 2020-05-05 PROCEDURE — 2580000003 HC RX 258: Performed by: INTERNAL MEDICINE

## 2020-05-05 PROCEDURE — 1240000000 HC EMOTIONAL WELLNESS R&B

## 2020-05-05 PROCEDURE — 6360000002 HC RX W HCPCS: Performed by: NURSE PRACTITIONER

## 2020-05-05 RX ORDER — RISPERIDONE 0.5 MG/1
0.5 TABLET, ORALLY DISINTEGRATING ORAL
Status: CANCELLED | OUTPATIENT
Start: 2020-05-05

## 2020-05-05 RX ORDER — HALOPERIDOL 5 MG/ML
2 INJECTION INTRAMUSCULAR EVERY 6 HOURS PRN
Status: DISCONTINUED | OUTPATIENT
Start: 2020-05-05 | End: 2020-05-15 | Stop reason: HOSPADM

## 2020-05-05 RX ORDER — TRAZODONE HYDROCHLORIDE 50 MG/1
25 TABLET ORAL NIGHTLY PRN
Status: DISCONTINUED | OUTPATIENT
Start: 2020-05-06 | End: 2020-05-15 | Stop reason: HOSPADM

## 2020-05-05 RX ORDER — LORAZEPAM 0.5 MG/1
0.5 TABLET ORAL EVERY 6 HOURS PRN
Status: DISCONTINUED | OUTPATIENT
Start: 2020-05-05 | End: 2020-05-15 | Stop reason: HOSPADM

## 2020-05-05 RX ORDER — ACETAMINOPHEN 650 MG/1
650 SUPPOSITORY RECTAL EVERY 6 HOURS PRN
Status: CANCELLED | OUTPATIENT
Start: 2020-05-05

## 2020-05-05 RX ORDER — RISPERIDONE 0.25 MG/1
0.5 TABLET, FILM COATED ORAL 2 TIMES DAILY
Status: DISCONTINUED | OUTPATIENT
Start: 2020-05-06 | End: 2020-05-06

## 2020-05-05 RX ORDER — POLYETHYLENE GLYCOL 3350 17 G/17G
17 POWDER, FOR SOLUTION ORAL DAILY PRN
Status: CANCELLED | OUTPATIENT
Start: 2020-05-05

## 2020-05-05 RX ORDER — ONDANSETRON 4 MG/1
4 TABLET, ORALLY DISINTEGRATING ORAL EVERY 8 HOURS PRN
Status: DISCONTINUED | OUTPATIENT
Start: 2020-05-05 | End: 2020-05-15 | Stop reason: HOSPADM

## 2020-05-05 RX ORDER — ESCITALOPRAM OXALATE 10 MG/1
10 TABLET ORAL DAILY
Status: DISCONTINUED | OUTPATIENT
Start: 2020-05-06 | End: 2020-05-15 | Stop reason: HOSPADM

## 2020-05-05 RX ORDER — HALOPERIDOL 1 MG/1
2 TABLET ORAL EVERY 6 HOURS PRN
Status: DISCONTINUED | OUTPATIENT
Start: 2020-05-05 | End: 2020-05-15 | Stop reason: HOSPADM

## 2020-05-05 RX ORDER — HYDRALAZINE HYDROCHLORIDE 25 MG/1
25 TABLET, FILM COATED ORAL ONCE
Status: COMPLETED | OUTPATIENT
Start: 2020-05-05 | End: 2020-05-05

## 2020-05-05 RX ORDER — ACETAMINOPHEN 325 MG/1
650 TABLET ORAL EVERY 6 HOURS PRN
Status: CANCELLED | OUTPATIENT
Start: 2020-05-05

## 2020-05-05 RX ORDER — LEVOTHYROXINE SODIUM 0.05 MG/1
50 TABLET ORAL DAILY
Status: CANCELLED | OUTPATIENT
Start: 2020-05-06

## 2020-05-05 RX ORDER — AMLODIPINE BESYLATE 5 MG/1
5 TABLET ORAL DAILY
Status: DISCONTINUED | OUTPATIENT
Start: 2020-05-06 | End: 2020-05-15 | Stop reason: HOSPADM

## 2020-05-05 RX ORDER — LANOLIN ALCOHOL/MO/W.PET/CERES
6 CREAM (GRAM) TOPICAL
Status: DISCONTINUED | OUTPATIENT
Start: 2020-05-05 | End: 2020-05-05 | Stop reason: HOSPADM

## 2020-05-05 RX ORDER — LANOLIN ALCOHOL/MO/W.PET/CERES
6 CREAM (GRAM) TOPICAL NIGHTLY PRN
Status: CANCELLED | OUTPATIENT
Start: 2020-05-05

## 2020-05-05 RX ORDER — ESCITALOPRAM OXALATE 10 MG/1
10 TABLET ORAL DAILY
Status: CANCELLED | OUTPATIENT
Start: 2020-05-06

## 2020-05-05 RX ORDER — RISPERIDONE 0.5 MG/1
0.5 TABLET, ORALLY DISINTEGRATING ORAL
Status: DISCONTINUED | OUTPATIENT
Start: 2020-05-05 | End: 2020-05-05 | Stop reason: HOSPADM

## 2020-05-05 RX ORDER — BENZTROPINE MESYLATE 1 MG/ML
1 INJECTION INTRAMUSCULAR; INTRAVENOUS 2 TIMES DAILY PRN
Status: DISCONTINUED | OUTPATIENT
Start: 2020-05-05 | End: 2020-05-15 | Stop reason: HOSPADM

## 2020-05-05 RX ORDER — AMLODIPINE BESYLATE 5 MG/1
5 TABLET ORAL DAILY
Status: CANCELLED | OUTPATIENT
Start: 2020-05-05

## 2020-05-05 RX ORDER — LORAZEPAM 2 MG/ML
1 INJECTION INTRAMUSCULAR
Status: CANCELLED | OUTPATIENT
Start: 2020-05-05

## 2020-05-05 RX ORDER — BENZTROPINE MESYLATE 1 MG/ML
1 INJECTION INTRAMUSCULAR; INTRAVENOUS 2 TIMES DAILY PRN
Status: CANCELLED | OUTPATIENT
Start: 2020-05-05

## 2020-05-05 RX ORDER — LANOLIN ALCOHOL/MO/W.PET/CERES
6 CREAM (GRAM) TOPICAL EVERY EVENING
Status: DISCONTINUED | OUTPATIENT
Start: 2020-05-06 | End: 2020-05-07

## 2020-05-05 RX ORDER — LANOLIN ALCOHOL/MO/W.PET/CERES
6 CREAM (GRAM) TOPICAL EVERY EVENING
Status: DISCONTINUED | OUTPATIENT
Start: 2020-05-05 | End: 2020-05-05

## 2020-05-05 RX ORDER — HALOPERIDOL 5 MG/ML
5 INJECTION INTRAMUSCULAR ONCE
Status: COMPLETED | OUTPATIENT
Start: 2020-05-05 | End: 2020-05-05

## 2020-05-05 RX ORDER — LORAZEPAM 2 MG/ML
1 INJECTION INTRAMUSCULAR EVERY 6 HOURS PRN
Status: DISCONTINUED | OUTPATIENT
Start: 2020-05-05 | End: 2020-05-15 | Stop reason: HOSPADM

## 2020-05-05 RX ORDER — MAGNESIUM HYDROXIDE/ALUMINUM HYDROXICE/SIMETHICONE 120; 1200; 1200 MG/30ML; MG/30ML; MG/30ML
30 SUSPENSION ORAL EVERY 6 HOURS PRN
Status: DISCONTINUED | OUTPATIENT
Start: 2020-05-05 | End: 2020-05-15 | Stop reason: HOSPADM

## 2020-05-05 RX ORDER — ACETAMINOPHEN 325 MG/1
650 TABLET ORAL EVERY 4 HOURS PRN
Status: DISCONTINUED | OUTPATIENT
Start: 2020-05-05 | End: 2020-05-15 | Stop reason: HOSPADM

## 2020-05-05 RX ORDER — LORAZEPAM 2 MG/ML
1 INJECTION INTRAMUSCULAR
Status: DISCONTINUED | OUTPATIENT
Start: 2020-05-05 | End: 2020-05-05 | Stop reason: HOSPADM

## 2020-05-05 RX ADMIN — HALOPERIDOL LACTATE 5 MG: 5 INJECTION, SOLUTION INTRAMUSCULAR at 15:50

## 2020-05-05 RX ADMIN — LORAZEPAM 1 MG: 2 INJECTION INTRAMUSCULAR; INTRAVENOUS at 13:49

## 2020-05-05 RX ADMIN — Medication 10 ML: at 07:45

## 2020-05-05 RX ADMIN — HYDRALAZINE HYDROCHLORIDE 25 MG: 25 TABLET, FILM COATED ORAL at 21:55

## 2020-05-05 RX ADMIN — METOPROLOL TARTRATE 2.5 MG: 5 INJECTION, SOLUTION INTRAVENOUS at 00:54

## 2020-05-05 RX ADMIN — METOPROLOL TARTRATE 2.5 MG: 5 INJECTION, SOLUTION INTRAVENOUS at 05:56

## 2020-05-05 RX ADMIN — LORAZEPAM 1 MG: 2 INJECTION INTRAMUSCULAR; INTRAVENOUS at 17:07

## 2020-05-05 ASSESSMENT — PAIN SCALES - GENERAL
PAINLEVEL_OUTOF10: 0
PAINLEVEL_OUTOF10: 0

## 2020-05-05 ASSESSMENT — SLEEP AND FATIGUE QUESTIONNAIRES
DO YOU HAVE DIFFICULTY SLEEPING: NO
AVERAGE NUMBER OF SLEEP HOURS: 8
DO YOU USE A SLEEP AID: COMMENT

## 2020-05-05 ASSESSMENT — LIFESTYLE VARIABLES: HISTORY_ALCOHOL_USE: NO

## 2020-05-05 NOTE — PROGRESS NOTES
Patient trying to pull IV pole into bed with her, kicking, hitting, and trying to bite staff. Cogentin PRN given IM in right arm. Will monitor.

## 2020-05-05 NOTE — PROGRESS NOTES
Patient remains to be restless and agitated despite dose of ativan. Psych NP, Keerthi, notified and per Dr. Peggy Rivera will place order for haldol.

## 2020-05-05 NOTE — PROGRESS NOTES
Pulmonary & Critical Care Medicine ICU Progress Note    CC: Agitation, HTN    Events of Last 24 hours: Agitated and violent overnight    Invasive Lines: piv    Vitals:  /61   Pulse 72   Temp 97.8 °F (36.6 °C) (Axillary)   Resp 25   SpO2 95%   on ra    Intake/Output Summary (Last 24 hours) at 5/5/2020 0831  Last data filed at 5/5/2020 0553  Gross per 24 hour   Intake 557 ml   Output 755 ml   Net -198 ml     EXAM:  Constitutional: ill appearing. Eyes: PERRL. No sclera icterus. ENT: Normal Nose. Normal Tongue. Neck:  Trachea is midline. No thyroid tenderness. Respiratory: No accessory muscle usage. no wheezes. No rales. No Rhonchi. Cardiovascular: Normal S1S2. Erven Hurl No murmur. No digit cyanosis. No digit clubbing. No LE edema. GI: Non-tender. Non-distended. Normal bowel sounds. No masses. No umbilical hernia. Skin: No rash on the exposed extremities. No Nodules on exposed extremities. Neuro: Alert. Follows commands. Moves all extremities. Not oriented  Psych: + agitation, no anxiety.     Scheduled Meds:   mupirocin   Nasal BID    amLODIPine  5 mg Oral Daily    metoprolol  2.5 mg Intravenous Q6H    sodium chloride flush  10 mL Intravenous 2 times per day    escitalopram  10 mg Oral Daily    levothyroxine  50 mcg Oral Daily    sodium chloride flush  10 mL Intravenous 2 times per day    enoxaparin  40 mg Subcutaneous Daily     PRN Meds:  acetaminophen **OR** acetaminophen, sodium chloride flush, benztropine mesylate, melatonin, sodium chloride flush, polyethylene glycol, promethazine **OR** ondansetron    Results:  CBC:   Recent Labs     05/02/20  1005 05/04/20  0411   WBC 4.7 9.2   HGB 13.5 14.5   HCT 40.7 43.1   MCV 97.2 98.4    218     BMP:   Recent Labs     05/02/20  1005 05/03/20  1130 05/04/20  0411    136 138   K 4.2 3.6 4.0    98* 104   CO2 27 20* 21   BUN 15 15 18   CREATININE 0.9 0.7 0.6     LIVER PROFILE:   Recent Labs     05/02/20  1005   AST 24   ALT 17   BILITOT

## 2020-05-05 NOTE — CONSULTS
Psychiatry Initial Consultation    Admission Date:    5/3/2020    Reason for Consult:  Aggressive behavioral disturbance    HPI:   Patient is a 80year old female with history of dementia, probable AD type. Pt was initially sent to our ED on Friday for aggression at her NH. Subsequently found to have a UTI and sent back to [facility] without ALIX consultation. Aggression continued and pt was sent back to the ED on Saturday AM where she received several rounds of IM medication for aggression and subsequently developed severe hypertension. She was sent to the geriatric psychiatry floor, but remained severely hypertensive. On Sunday AM, pt was unresponsive with hypertensive urgency and emergently transferred to the ICU. She has since received IV antihypertensive and her BP is improved. Pt has continued to manifest severe behavioral disturbance. She has been physically aggressive with staff on several occasions. She believes she is being held \"captive,\" and despite multiple efforts to reorient her to why she is in the hospital, she remains fixated on this idea. Pt was alert and oriented to self an hospital on exam.  She was irritable and argumentative. Voiced the above paranoia that she is being held captive and on several occasions threatened to [facility] you what will happen if you don't let me go. \"      Duration: Past month per NH  Severity: Severe  Context: as above  Associated symptoms: as above    Past Psychiatric History:    No known psychiatric history prior to dementia. Per NH, pt wasn't exhibiting behavioral disturbance until the past month since residents were being required to remain in their rooms. Was recently started on risperidone by psych NP at facility, but received only one dose prior to admission.     Past Medical History:  Past Medical History:   Diagnosis Date    [facility] ([facility]     Dementia ([facility] [facility]        Home Medications:  Prior to Admission medications    Medication Sig Start Date End

## 2020-05-05 NOTE — PROGRESS NOTES
Shift assessment completed and documented in doc flow sheet. Pt is alert and oriented to self only and is uncooperative and agitated. VSS, SR in the 70's on monitor, SpO2 96% on RA. PIV's WNL. Whyte catheter draining clear/yellow urine. Call light is within reach and pt encouraged to call with any needs. Bed is in lowest position with side rails up and wheels locked. Bed alarm on and telesitter in room for patient safety. Will monitor.

## 2020-05-06 PROBLEM — F02.C18 SEVERE MAJOR NEUROCOGNITIVE DISORDER DUE TO ALZHEIMER'S DISEASE WITH BEHAVIORAL DISTURBANCE (HCC): Status: ACTIVE | Noted: 2020-05-06

## 2020-05-06 PROBLEM — G30.9 SEVERE MAJOR NEUROCOGNITIVE DISORDER DUE TO ALZHEIMER'S DISEASE WITH BEHAVIORAL DISTURBANCE (HCC): Status: ACTIVE | Noted: 2020-05-06

## 2020-05-06 LAB
CHOLESTEROL, TOTAL: 217 MG/DL (ref 0–199)
EKG ATRIAL RATE: 81 BPM
EKG DIAGNOSIS: NORMAL
EKG P AXIS: 76 DEGREES
EKG P-R INTERVAL: 138 MS
EKG Q-T INTERVAL: 406 MS
EKG QRS DURATION: 70 MS
EKG QTC CALCULATION (BAZETT): 471 MS
EKG R AXIS: 23 DEGREES
EKG T AXIS: 63 DEGREES
EKG VENTRICULAR RATE: 81 BPM
ESTIMATED AVERAGE GLUCOSE: 114 MG/DL
HBA1C MFR BLD: 5.6 %
HDLC SERPL-MCNC: 63 MG/DL (ref 40–60)
LDL CHOLESTEROL CALCULATED: 141 MG/DL
TRIGL SERPL-MCNC: 67 MG/DL (ref 0–150)
VLDLC SERPL CALC-MCNC: 13 MG/DL

## 2020-05-06 PROCEDURE — 1240000000 HC EMOTIONAL WELLNESS R&B

## 2020-05-06 PROCEDURE — 93005 ELECTROCARDIOGRAM TRACING: CPT | Performed by: NURSE PRACTITIONER

## 2020-05-06 PROCEDURE — 99222 1ST HOSP IP/OBS MODERATE 55: CPT | Performed by: PHYSICIAN ASSISTANT

## 2020-05-06 PROCEDURE — 93010 ELECTROCARDIOGRAM REPORT: CPT | Performed by: INTERNAL MEDICINE

## 2020-05-06 PROCEDURE — 6370000000 HC RX 637 (ALT 250 FOR IP): Performed by: NURSE PRACTITIONER

## 2020-05-06 PROCEDURE — 99223 1ST HOSP IP/OBS HIGH 75: CPT | Performed by: PSYCHIATRY & NEUROLOGY

## 2020-05-06 RX ORDER — RISPERIDONE 0.5 MG/1
0.5 TABLET, ORALLY DISINTEGRATING ORAL
Status: DISCONTINUED | OUTPATIENT
Start: 2020-05-06 | End: 2020-05-07

## 2020-05-06 RX ORDER — HYDRALAZINE HYDROCHLORIDE 25 MG/1
25 TABLET, FILM COATED ORAL EVERY 6 HOURS PRN
Status: DISCONTINUED | OUTPATIENT
Start: 2020-05-06 | End: 2020-05-15 | Stop reason: HOSPADM

## 2020-05-06 RX ADMIN — ESCITALOPRAM OXALATE 10 MG: 10 TABLET ORAL at 08:57

## 2020-05-06 RX ADMIN — AMLODIPINE BESYLATE 5 MG: 5 TABLET ORAL at 08:57

## 2020-05-06 RX ADMIN — RISPERIDONE 0.5 MG: 0.25 TABLET ORAL at 08:57

## 2020-05-06 NOTE — BH NOTE
4 Eyes Skin Assessment     The patient is being assess for  Admission    I agree that 2 RN's have performed a thorough Head to Toe Skin Assessment on the patient. ALL assessment sites listed below have been assessed. Areas assessed by both nurses:   [x]   Head, Face, and Ears   [x]   Shoulders, Back, and Chest  [x]   Arms, Elbows, and Hands   [x]   Coccyx, Sacrum, and Ischum  [x]   Legs, Feet, and Heels        Does the Patient have Skin Breakdown?   No but does have saline lock in L AC        Kailash Prevention initiated:  Yes   Wound Care Orders initiated:  NA      [facility] nurse consulted for Pressure Injury (Stage 3,4, Unstageable, DTI, NWPT, and Complex wounds):  NA      Nurse 1 eSignature: Electronically signed by Sherrin Snellen, RN on 5/6/20 at 12:17 AM EDT    **SHARE this note so that the co-signing nurse is able to place an eSignature**    Nurse 2 eSignature: Electronically signed by Tere Bustos RN on 5/6/20 at 12:25 AM EDT

## 2020-05-06 NOTE — BH NOTE
`[facility] [place]  Admission Note     Admission Type:        Reason for admission:  Reason for Admission: Pt increasingly combative at [employer] . Declining for past month. Refusing meds.     PATIENT STRENGTHS:  Strengths: Positive Support    Patient Strengths and Limitations:  Limitations: Multiple barriers to leisure interests, Perceives need for assistance with self-care    Addictive Behavior:   Addictive Behavior  In the past 3 months, have you felt or has someone told you that you have a problem with:  : None  Do you have a history of Chemical Use?: No  Do you have a history of Alcohol Use?: No  Do you have a history of Street Drug Abuse?: No  Histroy of Prescripton Drug Abuse?: No    Medical Problems:   Past Medical History:   Diagnosis Date    Alzheimer disease ([facility]     Dementia ([facility] [facility]        Status EXAM:  Status and Exam  Normal: No  Facial Expression: (drowsy)  Affect: (drowsy, sleeping intermittently)  Level of Consciousness: Confused  Mood:Normal: No  Mood: Anxious, Irritable  Motor Activity:Normal: (flori)  Motor Activity: (flori)  Interview Behavior: Uncooperative/Withdrawn(medicated and asleep)  Preception: Others(See Comment)(oriented to self)  Attention:Normal: No  Attention: Unable to Concentrate  Thought Processes: Blocking  Thought Content:Normal: No  Thought Content: Poverty of Content  Hallucinations: Unable to assess  Delusions: Claudia Oliveira)  Memory:Normal: No  Memory: Poor Recent, Poor Remote  Insight and Judgment: No  Insight and Judgment: Poor Judgment, Poor Insight  Present Suicidal Ideation: No(denies)  Present Homicidal Ideation: No(denies)    Tobacco Screening:  Practical Counseling, on admission, levi X, if applicable and completed (first 3 are required if patient doesn't refuse):            ( )  Recognizing danger situations (included triggers and roadblocks)                    ( )  Coping skills (new ways to manage stress, exercise, relaxation techniques, changing routine,

## 2020-05-06 NOTE — H&P
Final    Ventricular Rate 05/06/2020 81  BPM Final    Atrial Rate 05/06/2020 81  BPM Final    P-R Interval 05/06/2020 138  ms Final    QRS Duration 05/06/2020 70  ms Final    Q-T Interval 05/06/2020 406  ms Final    QTc Calculation (Bazett) 05/06/2020 471  ms Final    P Axis 05/06/2020 76  degrees Final    R Axis 05/06/2020 23  degrees Final    T Axis 05/06/2020 63  degrees Final    Diagnosis 05/06/2020 Sinus rhythm with occasional Premature ventricular complexes and Premature atrial complexesNonspecific ST and T wave abnormalityProlonged QTAbnormal ECGWhen compared with ECG of 03-MAY-2020 12:51,Premature ventricular complexes are now PresentNonspecific T wave abnormality now evident in Lateral leadsConfirmed by Ann Marie Richards MD, [address] ([id number]) on 5/6/2020 10:56:57 AM   Final   Admission on 05/03/2020, Discharged on 05/05/2020   Component Date Value Ref Range Status    Ventricular Rate 05/03/2020 64  BPM Final    Atrial Rate 05/03/2020 64  BPM Final    P-R Interval 05/03/2020 144  ms Final    QRS Duration 05/03/2020 70  ms Final    Q-T Interval 05/03/2020 432  ms Final    QTc Calculation (Bazett) 05/03/2020 445  ms Final    P Axis 05/03/2020 62  degrees Final    R Axis 05/03/2020 26  degrees Final    T Axis 05/03/2020 72  degrees Final    Diagnosis 05/03/2020 Sinus rhythm with Premature atrial complexes with Aberrant conductionOtherwise normal ECGWhen compared with ECG of 03-MAY-2020 11:15,Aberrant conduction is now Present  sinus rhythm has returnedConfirmed by Ann Marie Richards MD, [address] ([place] [address]) on 5/4/2020 5:35:40 AM   Final    Troponin 05/03/2020 <0.01  <0.01 ng/mL Final    Methodology by Troponin T    Troponin 05/03/2020 <0.01  <0.01 ng/mL Final    Methodology by Troponin T    Sodium 05/04/2020 138  136 - 145 mmol/L Final    Potassium reflex Magnesium 05/04/2020 4.0  3.5 - 5.1 mmol/L Final    Chloride 05/04/2020 104  99 - 110 mmol/L Final    CO2 05/04/2020 21  21 - 32 mmol/L Final    Anion Gap 05/04/2020 13  3 - 16 Final    Glucose 05/04/2020 90  70 - 99 mg/dL Final    BUN 05/04/2020 18  7 - 20 mg/dL Final    CREATININE 05/04/2020 0.6  0.6 - 1.2 mg/dL Final    GFR Non- 05/04/2020 >60  >60 Final    Comment: >60 mL/min/1.73m2 EGFR, calc. for ages 25 and older using the  MDRD formula (not corrected for weight), is valid for stable  renal function.  GFR  05/04/2020 >60  >60 Final    Comment: Chronic Kidney Disease: less than 60 ml/min/1.73 sq.m. Kidney Failure: less than 15 ml/min/1.73 sq.m. Results valid for patients 18 years and older.       Calcium 05/04/2020 8.5  8.3 - 10.6 mg/dL Final    WBC 05/04/2020 9.2  4.0 - 11.0 K/uL Final    RBC 05/04/2020 4.38  4.00 - 5.20 M/uL Final    Hemoglobin 05/04/2020 14.5  12.0 - 16.0 g/dL Final    Hematocrit 05/04/2020 43.1  36.0 - 48.0 % Final    MCV 05/04/2020 98.4  80.0 - 100.0 fL Final    MCH 05/04/2020 33.0  26.0 - 34.0 pg Final    MCHC 05/04/2020 33.6  31.0 - 36.0 g/dL Final    RDW 05/04/2020 14.1  12.4 - 15.4 % Final    Platelets 33/73/7513 218  135 - 450 K/uL Final    MPV 05/04/2020 9.9  5.0 - 10.5 fL Final    Neutrophils % 05/04/2020 77.4  % Final    Lymphocytes % 05/04/2020 11.3  % Final    Monocytes % 05/04/2020 10.6  % Final    Eosinophils % 05/04/2020 0.2  % Final    Basophils % 05/04/2020 0.5  % Final    Neutrophils Absolute 05/04/2020 7.1  1.7 - 7.7 K/uL Final    Lymphocytes Absolute 05/04/2020 1.0  1.0 - 5.1 K/uL Final    Monocytes Absolute 05/04/2020 1.0  0.0 - 1.3 K/uL Final    Eosinophils Absolute 05/04/2020 0.0  0.0 - 0.6 K/uL Final    Basophils Absolute 05/04/2020 0.0  0.0 - 0.2 K/uL Final           Assessment and Plan:    Diagnoses:   Primary Psychiatric (DSM V) Diagnosis: Major neurocognitive disorder due to Alzheimer's Disease, moderate, with behavioral disturbance  Secondary Psychiatric (DSM V) Diagnoses: None  Chemical Dependency Diagnoses: None  Active Medical Diagnoses: New onset HTN, UTI    All conditions detailed above are being treated while patient is hospitalized. Tx plan: Generally: prevent self injury/aggression, stabilize mood/anxiety/psychotic/behavioral disturbance, establish/maintain aftercare, increase coping mechanisms, improve medication compliance. All conditions present on admission are being treated while pt is hospitalized. Legal Status: Voluntary via HCPOA    Primary Psychiatric Issues:  1. Dementia with behavioral disturbance:  -Continue escitalopram 10 mg daily  -Start melatonin 6 mg q1800  -Increase risperidone to 0.5 mg BID  -need to obtain additional collateral from family regarding history as pt unable to provide reliable history. Chemical Dependency Issues:  No issues    Function:  -Consult physical therapy  -Consult occupational therapy  -Falls precautions    Medical Problems:  Internal medicine has been consulted. Appreciate recs. 1. HTN:  Continue amlodipine 5 mg daily and hydralazine prn    2. UTI:  It seems that pt's abx were not restarted upon transfer to ICU. She is culture positive for beta strep UTI. Will discuss abx choice with IM. Code Status: DNR-CC    Disposition:    -Housing: LTC facility  -Current outpatient follow-up: Facility psychiatrist    Estimated length of stay: 5 to 7 days. Criteria for Discharge:  Not psychotic, not homicidal, not suicidal, behavioral disturbance under control, sleeping well, mood improved/stable, eating well, aftercare arranged. Spent > 70 minutes face to face with patient of which >50% was spent counseling and providing education regarding diagnosis, treatment options, and prognosis.     Get Navarrete MD  Staff Psychiatrist

## 2020-05-06 NOTE — PROGRESS NOTES
Occupational Therapy  Order received, chart reviewed. Patient sleeping soundly at this time and MD/RN recommend allowing her to sleep. Will reattempt.   Molly Alcantar, OTR/L [id number]

## 2020-05-06 NOTE — BH NOTE
Attempted to offer PB crackers  When patient became upset with staff encouraging bites. Patient threw crackers, cup and baby bottle.

## 2020-05-07 PROCEDURE — 6370000000 HC RX 637 (ALT 250 FOR IP): Performed by: INTERNAL MEDICINE

## 2020-05-07 PROCEDURE — 97161 PT EVAL LOW COMPLEX 20 MIN: CPT

## 2020-05-07 PROCEDURE — 97165 OT EVAL LOW COMPLEX 30 MIN: CPT

## 2020-05-07 PROCEDURE — 97116 GAIT TRAINING THERAPY: CPT

## 2020-05-07 PROCEDURE — 6370000000 HC RX 637 (ALT 250 FOR IP): Performed by: NURSE PRACTITIONER

## 2020-05-07 PROCEDURE — 97530 THERAPEUTIC ACTIVITIES: CPT

## 2020-05-07 PROCEDURE — 6370000000 HC RX 637 (ALT 250 FOR IP): Performed by: PSYCHIATRY & NEUROLOGY

## 2020-05-07 PROCEDURE — 99232 SBSQ HOSP IP/OBS MODERATE 35: CPT | Performed by: PHYSICIAN ASSISTANT

## 2020-05-07 PROCEDURE — 99232 SBSQ HOSP IP/OBS MODERATE 35: CPT | Performed by: PSYCHIATRY & NEUROLOGY

## 2020-05-07 PROCEDURE — 1240000000 HC EMOTIONAL WELLNESS R&B

## 2020-05-07 RX ORDER — RISPERIDONE 0.5 MG/1
0.5 TABLET, ORALLY DISINTEGRATING ORAL ONCE
Status: COMPLETED | OUTPATIENT
Start: 2020-05-07 | End: 2020-05-07

## 2020-05-07 RX ORDER — LEVOTHYROXINE SODIUM 0.05 MG/1
50 TABLET ORAL DAILY
Status: DISCONTINUED | OUTPATIENT
Start: 2020-05-07 | End: 2020-05-15 | Stop reason: HOSPADM

## 2020-05-07 RX ORDER — RISPERIDONE 0.5 MG/1
0.5 TABLET, ORALLY DISINTEGRATING ORAL
Status: DISCONTINUED | OUTPATIENT
Start: 2020-05-07 | End: 2020-05-08

## 2020-05-07 RX ORDER — LANOLIN ALCOHOL/MO/W.PET/CERES
6 CREAM (GRAM) TOPICAL
Status: DISCONTINUED | OUTPATIENT
Start: 2020-05-07 | End: 2020-05-15 | Stop reason: HOSPADM

## 2020-05-07 RX ADMIN — MELATONIN TAB 3 MG 6 MG: 3 TAB at 16:24

## 2020-05-07 RX ADMIN — LEVOTHYROXINE SODIUM 50 MCG: 50 TABLET ORAL at 10:12

## 2020-05-07 RX ADMIN — RISPERIDONE 0.5 MG: 0.5 TABLET, ORALLY DISINTEGRATING ORAL at 10:20

## 2020-05-07 RX ADMIN — AMLODIPINE BESYLATE 5 MG: 5 TABLET ORAL at 10:12

## 2020-05-07 RX ADMIN — HALOPERIDOL 2 MG: 1 TABLET ORAL at 19:39

## 2020-05-07 RX ADMIN — RISPERIDONE 0.5 MG: 0.5 TABLET, ORALLY DISINTEGRATING ORAL at 16:24

## 2020-05-07 RX ADMIN — ESCITALOPRAM OXALATE 10 MG: 10 TABLET ORAL at 10:12

## 2020-05-07 NOTE — PROGRESS NOTES
Inpatient Occupational Therapy  Evaluation and Treatment    Unit: [facility]  Date:  5/7/2020  Patient Name:    Christiano Dunn  Admitting diagnosis:  Severe major neurocognitive disorder due to Alzheimer's disease with [facility] [G30.9, F02.81]  Admit Date:  5/5/2020  Precautions/Restrictions/WB Status/ Lines/ Wounds/ Oxygen: fall risk, general BHI precautions  History of Present Illness: Patient was sent to the ED from her nursing home for reported aggressive behavioral disturbance. She was found to have a UTI and initially sent back to her NH, but then again sent to the ED for ongoing aggression. Treatment Time: 10:55-11:25  Treatment Number: 1   Billable Treatment Time: 15 minutes   Total Treatment Time:   30   minutes    Patient Goals for Therapy:  Did not state      Discharge Recommendations: SNF  DME needs for discharge: defer to facility       Therapy recommendations for staff:   Assist of 1 with use of rolling walker (RW) and gait belt for all ambulation within community room    [facility] S4 Level Recommendation:  NA  AM-PAC Score: AM-PAC Inpatient Daily Activity Raw Score: 17    Preadmission Environment    Pt. Lives in [address] [place] [address] at [facility]. Preadmission Status / PLOF:  Unable to provide PLOF    Pain  No  Rating:NA  Location:  Pain Medicine Status: Denies need      Cognition    A&O Person   Able to follow 1 step commands inconsistently. Patient in drowsy state. Required maximal cues to maintain attention to task    Subjective  Patient lying supine in bed with no family present - no visitors present due to COVID-19 restrictions  Pt agreeable to this OT eval & tx. Upper Extremity ROM:    WFL,  pt able to perform all bed mobility, transfers, and gait without ROM limitation.     Upper Extremity Strength:    BUE strength impaired but not formally assessed w/ MMT    Upper Extremity Sensation    NT    Upper Extremity Proprioception:  Impaired    Coordination and while in acute care setting for therapeutic exercises, bed mobility, transfers, dressing, bathing, family/patient education, ADL/IADL retraining, energy conservation training.        Kayleen Orourke, OTR/L #[id number]      If patient discharges from this facility prior to next visit, this note will serve as the Discharge Summary

## 2020-05-07 NOTE — PROGRESS NOTES
rolling walker (RW)  Distance:  80 ft + 20 ft  Cued on: Patient was drifting on the L side and needed cueing for redirection and negotiating the RW, cueing needed to maintain upright posture and increase step height + length. WC mobility   NA    Activity Tolerance   No nausea, dizziness, pain or SOB noted w/activity    Positioning Needs   Pt sitting up in common area within line of sight of nursing staff, needs within reach and Pt sitting up in chair with needs and call light in reach, BLE elevated    Therapeutic Exercises Initiated    Exercises in bold (in below chart only) performed in unit today. Items not bolded are carried forward from prior visits for continuity of the record. Exercise/Equipment Resistance/Repetitions Other comments     LAQ 10 reps x 2 sets      High knee marching 10 reps x 2 sets     Supported standing marching 10 reps x 2 sets with RW                    Patient/Family Education   Educated on importance of continued strengthening to facilitate functional return  Educated on safety with transfers  Educated on proper gait pattern and RW distance  Educated on proper positioning and posture with functional mobility  Educated on role of PT, POC as well as importance of continued activity    Patients response to evaluation/treatment:   Pt tolerated treatment well  Pt limited by cognition or behavior    Assessment of Deficits  Pt is 80year old female presenting within the geriatric behavioral health institute at [facility] with medical diagnosis of Severe major neurocognitive disorder due to Alzheimer's disease with behavioral disturbance ([facility] [G30.9, F02.81]. Presents today with weakness, poor safety awareness, gait abnormality , decreased transfer ability  and poor balance. Would benefit from continued IP PT to address below impairments and restore function.  Recommending SNF upon discharge as patient functioning well below baseline, demonstrates good rehab potential and unable to return home due to limited safety awareness and patient resident of LTC . Impairments/ Deficits  Decreased strength  Abnormality of gait  Decreased balance  Poor posture/alignment  Decreased functional status below baseline    Goals : To be met in 3 visits:  1). Demonstrate improved safety awareness with functional mobility requiring less overall cueing. To be met in 6 visits:  1). Supine to/from sit  Supervision to promote return to functional ADLs  2). Sit to/from stand Supervision to promote return to functional ADLs  3). Gait: Ambulate 150 ft. with Supervision and use of LRAD (least restrictive assistive device) demonstrating improved gait pattern  4). Tolerate B LE exercises 3 sets of 10-15 reps to improve strength   5). Tolerated standing balance exercises with improved balance to Fair + grade    Rehabilitation Potential    Fair  Strengths for achieving goals include:   Pt cooperative  Barriers to achieving goals include:    Cognitive deficits      Plan    To be seen 2-5x/week while in [facility] setting for   Therapeutic Exercise, Neuromuscular Re-Education, Gait training and Therapeutic Activity     Timed Code Treatment Minutes: 20 minutes + Evaluation time  Total Treatment Time:  30 minutes    Alfonso Avila MS PT, # [id number]        If patient discharges from this facility prior to next visit, this note will serve as the Discharge Summary.

## 2020-05-07 NOTE — PROGRESS NOTES
Department of Psychiatry  Attending Progress Note    Admission Date:    5/5/2020    Chief complaint / Reason for Admission:  Aggressive and psychotic behavioral disturbance    Patient's chart was reviewed, case was discussed with nursing/OT/RT staff, and collaborated with  about the treatment plan. SUBJECTIVE:   Over last 24 hours:  Behavioral outbursts: Yes   Non-aggressive behavioral disturbance: Yes  Medication compliant: Yes  Need for seclusion/restraints: No  Sleeping adequately:  No  Appetite adequate: No  Attending groups: No    Pt demonstrated significant sundowning yesterday evening starting around 5:30 PM.  Was paranoid, uncooperative, throwing things and hitting staff. Eventually fell asleep around midnight. This AM pt was awake and alert, but refused to answer any of my questions, simply stared silently. Appeared irritable.     Progressing overall: Not yet progressing  Suicidal ideation: did not voice  Homicidal ideation: did not voice  Medication side effects: None obvious    ROS: Patient has new complaints: no    Current Medications Ordered:   levothyroxine  50 mcg Oral Daily    melatonin  6 mg Oral Daily    risperiDONE  0.5 mg Oral 2 times per day    amLODIPine  5 mg Oral Daily    escitalopram  10 mg Oral Daily      PRN Meds: hydrALAZINE, acetaminophen, LORazepam **OR** LORazepam, haloperidol lactate **OR** haloperidol, traZODone, benztropine mesylate, magnesium hydroxide, aluminum & magnesium hydroxide-simethicone, ondansetron     Objective:     PE:    /68   Pulse 65   Temp 97.1 °F (36.2 °C) (Oral)   Resp 16   SpO2 96%       Motor / Gait: psychomotor retardation, gait deferred    Mental Status Examination:    Appearance: WF, appears stated age, lying in bed, wearing hospital gown, poor grooming and hygiene   Behavior/Attitude toward examiner: Uncooperative  Speech:  Volitionally mute  Mood:  Would not voice a mood state  Affect:  Irritable  Thought processes: Unable to assess due to lack of cooperation  Thought Content: Unable to assess due to lack of cooperation  Perceptions: Unable to assess due to lack of cooperation  Attention: Unable to assess due to lack of cooperation  Abstraction: Unable to assess due to lack of cooperation  Cognition: Severely impaired  Insight: Poor insight   Judgment: Impaired judgment      LAB: Reviewed labs from last 24 hours      Dx:   Diagnoses:   Primary Psychiatric (DSM V) Diagnosis: Major neurocognitive disorder due to Alzheimer's Disease, moderate, with behavioral disturbance  Secondary Psychiatric (DSM V) Diagnoses: None  Chemical Dependency Diagnoses: None  Active Medical Diagnoses: New onset HTN, UTI     All conditions detailed above are being treated while patient is hospitalized.      Tx plan: Generally: prevent self injury/aggression, stabilize mood/anxiety/psychotic/behavioral disturbance, establish/maintain aftercare, increase coping mechanisms, improve medication compliance.  All conditions present on admission are being treated while pt is hospitalized.      Legal Status: Voluntary via HCPOA     Primary Psychiatric Issues:  1. Dementia with behavioral disturbance:  -Continue escitalopram 10 mg daily  -Continue melatonin 6 mg but move up to 1600  -Continue risperidone 0.5 mg BID but move up evening dose to 1600.     Chemical Dependency Issues:  No issues     Function:  -Consult physical therapy  -Consult occupational therapy  -Falls precautions     Medical Problems:  Internal medicine has been consulted. Appreciate recs.     1. HTN:  Continue amlodipine 5 mg daily and hydralazine prn     2. Asymptomatic bacteruria:  Discussed with IM. Pt without symptoms and low colony count.   Will not need abx.     Code Status: DNR-CC     Disposition:    -Housing: LTC facility  -Current outpatient follow-up: Facility psychiatrist     Estimated length of stay: 5 to 7 days.     Criteria for Discharge:  Not psychotic, not homicidal, not

## 2020-05-08 PROCEDURE — 97110 THERAPEUTIC EXERCISES: CPT

## 2020-05-08 PROCEDURE — 99232 SBSQ HOSP IP/OBS MODERATE 35: CPT | Performed by: PSYCHIATRY & NEUROLOGY

## 2020-05-08 PROCEDURE — 6370000000 HC RX 637 (ALT 250 FOR IP): Performed by: NURSE PRACTITIONER

## 2020-05-08 PROCEDURE — 99232 SBSQ HOSP IP/OBS MODERATE 35: CPT | Performed by: PHYSICIAN ASSISTANT

## 2020-05-08 PROCEDURE — 1240000000 HC EMOTIONAL WELLNESS R&B

## 2020-05-08 PROCEDURE — 97116 GAIT TRAINING THERAPY: CPT

## 2020-05-08 PROCEDURE — 6370000000 HC RX 637 (ALT 250 FOR IP): Performed by: PSYCHIATRY & NEUROLOGY

## 2020-05-08 PROCEDURE — 6370000000 HC RX 637 (ALT 250 FOR IP): Performed by: INTERNAL MEDICINE

## 2020-05-08 RX ORDER — RISPERIDONE 1 MG/1
1 TABLET, ORALLY DISINTEGRATING ORAL
Status: DISCONTINUED | OUTPATIENT
Start: 2020-05-08 | End: 2020-05-12

## 2020-05-08 RX ORDER — CARBAMAZEPINE 200 MG/1
100 TABLET ORAL
Status: DISCONTINUED | OUTPATIENT
Start: 2020-05-08 | End: 2020-05-08

## 2020-05-08 RX ADMIN — ESCITALOPRAM OXALATE 10 MG: 10 TABLET ORAL at 09:07

## 2020-05-08 RX ADMIN — AMLODIPINE BESYLATE 5 MG: 5 TABLET ORAL at 09:07

## 2020-05-08 RX ADMIN — LEVOTHYROXINE SODIUM 50 MCG: 50 TABLET ORAL at 09:25

## 2020-05-08 RX ADMIN — MELATONIN TAB 3 MG 6 MG: 3 TAB at 16:16

## 2020-05-08 RX ADMIN — RISPERIDONE 0.5 MG: 0.5 TABLET, ORALLY DISINTEGRATING ORAL at 09:07

## 2020-05-08 RX ADMIN — RISPERIDONE 1 MG: 1 TABLET, ORALLY DISINTEGRATING ORAL at 16:16

## 2020-05-08 ASSESSMENT — PAIN SCALES - GENERAL: PAINLEVEL_OUTOF10: 0

## 2020-05-08 NOTE — BH NOTE
Annemarie Collado has been pleasant and cooperative today. Alert to self only. Given bed bath upon awaking this morning. Ate 50 % of breakfast and 240 ml fluid intake. Medication compliant whole with water. Sitting out in dayroom. Ate 100% of lunch and 360 ml fluid. Spoke with her daughter on the phone. She did get a little agitated when another patient that is confused was talking to her about getting out of here, pt redirected easily. Asked pt to bed per her requestt requested to lay down  around 1300 and is currently resting with eyes closed. Denies SI/HI/AVH. No RTIS noted. Bed in low locked position. Bed alarm on.

## 2020-05-08 NOTE — PROGRESS NOTES
line of sight of nursing staff, needs within reach, chair alarm set    Activity Tolerance and Response to Treatment  No nausea, dizziness, pain or SOB noted w/activity  SpO2: unable to get reading  HR: unable to get reading  Pt tolerated treatment well  Pt limited by fatigue    Assessment   Pt tolerated treatment well but fatigued quickly with ambulation. Pt required increased assistance for standing balance and walker management with increased fatigue. Pt would benefit from additional posture training exercises to improve standing and sitting balance. Recommending ECF upon discharge as patient functioning well below baseline, demonstrates good rehab potential and unable to return home due to level of assist above ability of caregiver to handle and limited safety awareness. Goals (all goals ongoing unless otherwise indicated)  To be met in 3 visits:  1). Demonstrate improved safety awareness with functional mobility requiring less overall cueing.      To be met in 6 visits:  1). Supine to/from sit  Supervision to promote return to functional ADLs  2). Sit to/from stand   Supervision to promote return to functional ADLs  3). Gait: Ambulate 150 ft. with Supervision and use of LRAD (least restrictive assistive device) demonstrating improved gait pattern  4). Tolerate B LE exercises 3 sets of 10-15 reps to improve strength   5). Tolerated standing balance exercises with improved balance to Fair + grade    Plan   Continue with plan of care. Time Coded Treatment Minutes:   25 minutes    Total Treatment Time:   25 minutes    Electronically signed by Destini Ramirez PT on 5/8/20 at 6:19 PM EDT    If patient discharges from this facility prior to next visit, this note will serve as the Discharge Summary.

## 2020-05-08 NOTE — PROGRESS NOTES
Progress Note    Admit Date:  5/5/2020    Subjective:  Ms. Сергей Whittington states she is feeling much better today. Denies any complaints. Nursing reports she has been very cooperative today, taken all of her medications, and has eaten all of her meals. Objective:   Patient Vitals for the past 4 hrs:   BP Temp Temp src Pulse Resp SpO2   05/08/20 0930 130/70 97 °F (36.1 °C) Oral 64 15 96 %       Intake/Output Summary (Last 24 hours) at 5/8/2020 1212  Last data filed at 5/7/2020 1236  Gross per 24 hour   Intake 0 ml   Output --   Net 0 ml       Physical Exam:  Gen: Elderly female, No distress. Drowsy but awakens easily to voice, resting in bed  Eyes: No conjunctival injection. ENT: No discharge. Pharynx clear. Dry mucous membranes  Neck: Trachea midline. Resp: No accessory muscle use. No crackles. No wheezes. No rhonchi. CV: Regular rate. Regular rhythm. No murmur. No rub. No edema. GI: Non-tender. Non-distended. Normal bowel sounds. Skin: Warm and dry. M/S: No cyanosis. No joint deformity. No clubbing. Neuro: Drowsy but awakens to voice. Follows some commands. Generalized weakness. No focal neurologic deficit on exam. Oriented to self only   Psych: Per psychiatry team evaluation     Scheduled Meds:   risperiDONE  1 mg Oral 2 times per day    levothyroxine  50 mcg Oral Daily    melatonin  6 mg Oral Daily    amLODIPine  5 mg Oral Daily    escitalopram  10 mg Oral Daily       Continuous Infusions:      PRN Meds:  hydrALAZINE, acetaminophen, LORazepam **OR** LORazepam, haloperidol lactate **OR** haloperidol, traZODone, benztropine mesylate, magnesium hydroxide, aluminum & magnesium hydroxide-simethicone, ondansetron    CULTURES  Urine Culture, Routine Abnormal  05/01/2020  9:00 PM [address] [facility]   <50,000 CFU/ml mixed skin/urogenital vilma.  No further workup    Organism Abnormal  05/01/2020  9:00 PM [facility]   Strep agalactiae (Beta Strep Group B)    Urine Culture, Routine 05/01/2020

## 2020-05-08 NOTE — PLAN OF CARE
Pt is alert with confusion. She is angry and paranoid and states the staff is holding her hostage. She continues to try to stand up without assist. She is reminded multiple times to wait for assist. Her son called and she was irritated on the phone. She was angry and yelling at her son. She stated that her kids had left her here to die, that she was tied up here and we were killing her and that the staff was poisoning her. She threatened the PCA that she was going to \"tear her titties off\". She was hitting at staff during redirection to stay seated. Haldol PRN was administered crushed in ice cream. She is oriented to self only. She is difficult to redirect and has a difficult time following directions.

## 2020-05-09 PROCEDURE — 6370000000 HC RX 637 (ALT 250 FOR IP): Performed by: NURSE PRACTITIONER

## 2020-05-09 PROCEDURE — 6370000000 HC RX 637 (ALT 250 FOR IP): Performed by: INTERNAL MEDICINE

## 2020-05-09 PROCEDURE — 6360000002 HC RX W HCPCS: Performed by: PSYCHIATRY & NEUROLOGY

## 2020-05-09 PROCEDURE — 1240000000 HC EMOTIONAL WELLNESS R&B

## 2020-05-09 PROCEDURE — 6370000000 HC RX 637 (ALT 250 FOR IP): Performed by: PSYCHIATRY & NEUROLOGY

## 2020-05-09 PROCEDURE — 6360000002 HC RX W HCPCS: Performed by: NURSE PRACTITIONER

## 2020-05-09 RX ORDER — HALOPERIDOL 5 MG/ML
2 INJECTION INTRAMUSCULAR ONCE
Status: COMPLETED | OUTPATIENT
Start: 2020-05-09 | End: 2020-05-09

## 2020-05-09 RX ADMIN — RISPERIDONE 1 MG: 1 TABLET, ORALLY DISINTEGRATING ORAL at 16:09

## 2020-05-09 RX ADMIN — RISPERIDONE 1 MG: 1 TABLET, ORALLY DISINTEGRATING ORAL at 08:49

## 2020-05-09 RX ADMIN — HALOPERIDOL LACTATE 2 MG: 5 INJECTION INTRAMUSCULAR at 16:53

## 2020-05-09 RX ADMIN — LEVOTHYROXINE SODIUM 50 MCG: 50 TABLET ORAL at 06:28

## 2020-05-09 RX ADMIN — MELATONIN TAB 3 MG 6 MG: 3 TAB at 16:09

## 2020-05-09 RX ADMIN — AMLODIPINE BESYLATE 5 MG: 5 TABLET ORAL at 08:49

## 2020-05-09 RX ADMIN — ESCITALOPRAM OXALATE 10 MG: 10 TABLET ORAL at 08:49

## 2020-05-09 RX ADMIN — LORAZEPAM 1 MG: 2 INJECTION INTRAMUSCULAR; INTRAVENOUS at 16:53

## 2020-05-09 RX ADMIN — HALOPERIDOL LACTATE 2 MG: 5 INJECTION INTRAMUSCULAR at 18:51

## 2020-05-09 NOTE — BH NOTE
Completed 1:1 interview and assessment with patient. Morning medications passed. Patient took medications crushed with strawberry yogurt. Patient stated they are feeling fine today. Patient denies SI/HI/AVH at this time. Patient makes fair eye contact. Patient is alert and oriented 1x- self only. Patient is resting at this time at this time. Will continue to monitor.

## 2020-05-09 NOTE — BH NOTE
Pt alert and oriented to self only. Pt irritable, but calm. No HS medications to give this evening. Pt isolative to room this evening. Pt denies SI/HI/AVH and no RTIS noted. Pt has no other needs at this time.

## 2020-05-09 NOTE — GROUP NOTE
Group Therapy Note    Date: 5/9/2020    Group Start Time: 8269  Group End Time: [address]        Group Therapy Note    Attendees: 3    Patient's Goal: to engage in Mood Elevation intervention to improve mood, increase autonomy, and improve overall quality of life. Notes: Nellie Turner actively listened to music utilized. At the beginning of group, pt reported her mood as \"I'm not feeling . \" Pt actively chose music to listen to when given choices. Pt's main response to music was \"That doesn't sound like my son's music\" and was unable to expand upon this comment when prompted. Nellie Turner reported feeling \"pretty good\" at the conclusion of group and was observed smiling softly. Pt actively listened to processing discussion. Status After Intervention:  Improved    Participation Level:  Active Listener    Participation Quality: Appropriate and Attentive      Speech:  normal      Thought Process/Content: Confused      Affective Functioning: Flat      Mood: anxious      Level of consciousness:  Alert and Attentive      Response to Learning: Able to verbalize current knowledge/experience and Progressing to goal      Endings: None Reported    Modes of Intervention: Education, Support, Activity and Media      Discipline Responsible: Psychoeducational Specialist      Signature:  Brittany Madrid MT-BC

## 2020-05-09 NOTE — BH NOTE
Therapist attempted to invite Aj Lara, who goes by [place], to 1:15 pm Art Therapy / Leisure Group, but per staff, patient was sleeping and needed to rest.

## 2020-05-09 NOTE — BH NOTE
Called Dr. John Paul Reinoso, with concerns about patient still being aggressive and being unsteady in seclusion. Orders to give another dose of Haldol (see MAR), and put a second mattress in seclusion room floor. Patient currently hitting door and calling staff names. Patient received 2 mg haldol in Left deltoid. When seclusion room door was opened, patient proceeded to kick at staff and try to scratch them.

## 2020-05-09 NOTE — BH NOTE
Patient is now in locked seclusion. Patient stood up and opened door. Rn explained to her that she was in there for safety and quiet time, at this point patient tried to bite and scratch RN. Patient was escorted back to seclusion by 2 RNs and door was locked. MD notified and family notified.

## 2020-05-09 NOTE — PROGRESS NOTES
wearing hospital gown, poor grooming and hygiene   Behavior/Attitude toward examiner: Cooperative and pleasant this AM  Speech:  Nml volume, and tone, overall impoverished  Mood: \"All right\"  Affect:  Constricted  Thought processes:  Linear, but impoverished  Thought Content: Denies SI, denies HI, did not voice paranoid delusions during interaction, but did last night, +confabulation  Perceptions: Denies AVH, not actively RTIS  Attention: Impaired  Abstraction: Curtice  Cognition: Severely impaired  Insight: Poor insight   Judgment: Impaired judgment      LAB: Reviewed labs from last 24 hours      Dx:   Diagnoses:   Primary Psychiatric (DSM V) Diagnosis: Major neurocognitive disorder due to Alzheimer's Disease, moderate, with behavioral disturbance  Secondary Psychiatric (DSM V) Diagnoses: None  Chemical Dependency Diagnoses: None  Active Medical Diagnoses: New onset HTN     All conditions detailed above are being treated while patient is hospitalized.      Tx plan: Generally: prevent self injury/aggression, stabilize mood/anxiety/psychotic/behavioral disturbance, establish/maintain aftercare, increase coping mechanisms, improve medication compliance.  All conditions present on admission are being treated while pt is hospitalized.      Legal Status: Voluntary via HCPOA     Primary Psychiatric Issues:  1. Dementia with behavioral disturbance:  -Continue escitalopram 10 mg daily  -Continue melatonin 6 mg but move up to 1600  -Increase risperidone to 1 mg BID but move up evening dose to 1600. -If irritability/lability continue over weekend, start carbamazepine 100 mg at 0900 and 1600.     Chemical Dependency Issues:  No issues     Function:  -Consult physical therapy  -Consult occupational therapy  -Falls precautions     Medical Problems:  Internal medicine has been consulted. Appreciate recs.     1. HTN:  Continue amlodipine 5 mg daily and hydralazine prn     2. Asymptomatic bacteruria:  Discussed with IM.   Pt

## 2020-05-10 PROCEDURE — 6370000000 HC RX 637 (ALT 250 FOR IP): Performed by: INTERNAL MEDICINE

## 2020-05-10 PROCEDURE — 6370000000 HC RX 637 (ALT 250 FOR IP): Performed by: PSYCHIATRY & NEUROLOGY

## 2020-05-10 PROCEDURE — 6370000000 HC RX 637 (ALT 250 FOR IP): Performed by: NURSE PRACTITIONER

## 2020-05-10 PROCEDURE — 1240000000 HC EMOTIONAL WELLNESS R&B

## 2020-05-10 PROCEDURE — 99233 SBSQ HOSP IP/OBS HIGH 50: CPT | Performed by: PSYCHIATRY & NEUROLOGY

## 2020-05-10 RX ORDER — CARBAMAZEPINE 200 MG/1
100 TABLET ORAL 2 TIMES DAILY
Status: DISCONTINUED | OUTPATIENT
Start: 2020-05-11 | End: 2020-05-10

## 2020-05-10 RX ORDER — CARBAMAZEPINE 200 MG/1
100 TABLET ORAL
Status: DISCONTINUED | OUTPATIENT
Start: 2020-05-11 | End: 2020-05-15 | Stop reason: HOSPADM

## 2020-05-10 RX ADMIN — ESCITALOPRAM OXALATE 10 MG: 10 TABLET ORAL at 08:38

## 2020-05-10 RX ADMIN — LEVOTHYROXINE SODIUM 50 MCG: 50 TABLET ORAL at 06:36

## 2020-05-10 RX ADMIN — RISPERIDONE 1 MG: 1 TABLET, ORALLY DISINTEGRATING ORAL at 08:38

## 2020-05-10 RX ADMIN — MELATONIN TAB 3 MG 6 MG: 3 TAB at 16:00

## 2020-05-10 RX ADMIN — LORAZEPAM 0.5 MG: 0.5 TABLET ORAL at 10:50

## 2020-05-10 RX ADMIN — RISPERIDONE 1 MG: 1 TABLET, ORALLY DISINTEGRATING ORAL at 16:00

## 2020-05-10 RX ADMIN — TRAZODONE HYDROCHLORIDE 25 MG: 50 TABLET ORAL at 21:03

## 2020-05-10 NOTE — BH NOTE
Pt calm, sleeping, cooperative. Pt taken out of seclusion via wheelchair and placed in bed with her bed alarm on. She is sleeping with even respirations. Will continue to monitor.

## 2020-05-10 NOTE — PROGRESS NOTES
Department of Psychiatry  Attending Progress Note    Admission Date:    5/5/2020    Chief complaint / Reason for Admission:  Aggressive and psychotic behavioral disturbance    Patient's chart was reviewed, case was discussed with nursing    SUBJECTIVE:   Over last 24 hours:  Behavioral outbursts: Yes   Non-aggressive behavioral disturbance: Yes  Medication compliant: Yes  Need for seclusion/restraints: yes  Sleeping adequately:  No  Appetite adequate: No  Attending groups: No    Sundowning last night required seclusion and emergency medication. Today, she tells me she is at home and that it is April 8, 1988. Elliot King are putting medication in my pudding for my 'silly head' illness\"    Progressing overall: Not yet progressing  Suicidal ideation: did not voice  Homicidal ideation: did not voice  Medication side effects: None obvious    ROS: Patient has new complaints: no    Current Medications Ordered:   risperiDONE  1 mg Oral 2 times per day    levothyroxine  50 mcg Oral Daily    melatonin  6 mg Oral Daily    amLODIPine  5 mg Oral Daily    escitalopram  10 mg Oral Daily      PRN Meds: hydrALAZINE, acetaminophen, LORazepam **OR** LORazepam, haloperidol lactate **OR** haloperidol, traZODone, benztropine mesylate, magnesium hydroxide, aluminum & magnesium hydroxide-simethicone, ondansetron     Objective:     PE:    BP (!) 105/52   Pulse 76   Temp 97.4 °F (36.3 °C) (Oral)   Resp 18   SpO2 95%       Motor / Gait: psychomotor retardation, gait normal     Mental Status Examination:    Appearance: WF, appears stated age, seated in dayroom, wearing hospital gown, poor grooming and hygiene   Behavior/Attitude toward examiner: Cooperative and pleasant this AM  Speech:  Nml volume, and tone, overall impoverished  Mood:   \"All right\"  Affect:  Constricted  Thought processes:  Linear, but impoverished  Thought Content: Denies SI, denies HI, did not voice paranoid delusions during interaction, but did last night,

## 2020-05-11 PROCEDURE — 99231 SBSQ HOSP IP/OBS SF/LOW 25: CPT | Performed by: PHYSICIAN ASSISTANT

## 2020-05-11 PROCEDURE — 97535 SELF CARE MNGMENT TRAINING: CPT

## 2020-05-11 PROCEDURE — 97530 THERAPEUTIC ACTIVITIES: CPT

## 2020-05-11 PROCEDURE — 1240000000 HC EMOTIONAL WELLNESS R&B

## 2020-05-11 PROCEDURE — 97110 THERAPEUTIC EXERCISES: CPT

## 2020-05-11 PROCEDURE — 97116 GAIT TRAINING THERAPY: CPT

## 2020-05-11 PROCEDURE — 6370000000 HC RX 637 (ALT 250 FOR IP): Performed by: NURSE PRACTITIONER

## 2020-05-11 PROCEDURE — 6370000000 HC RX 637 (ALT 250 FOR IP): Performed by: PSYCHIATRY & NEUROLOGY

## 2020-05-11 PROCEDURE — 6370000000 HC RX 637 (ALT 250 FOR IP): Performed by: INTERNAL MEDICINE

## 2020-05-11 PROCEDURE — 99232 SBSQ HOSP IP/OBS MODERATE 35: CPT | Performed by: PSYCHIATRY & NEUROLOGY

## 2020-05-11 RX ADMIN — CARBAMAZEPINE 100 MG: 200 TABLET ORAL at 08:14

## 2020-05-11 RX ADMIN — AMLODIPINE BESYLATE 5 MG: 5 TABLET ORAL at 08:14

## 2020-05-11 RX ADMIN — RISPERIDONE 1 MG: 1 TABLET, ORALLY DISINTEGRATING ORAL at 08:14

## 2020-05-11 RX ADMIN — CARBAMAZEPINE 100 MG: 200 TABLET ORAL at 16:06

## 2020-05-11 RX ADMIN — MELATONIN TAB 3 MG 6 MG: 3 TAB at 16:06

## 2020-05-11 RX ADMIN — ESCITALOPRAM OXALATE 10 MG: 10 TABLET ORAL at 08:14

## 2020-05-11 RX ADMIN — RISPERIDONE 1 MG: 1 TABLET, ORALLY DISINTEGRATING ORAL at 16:06

## 2020-05-11 RX ADMIN — LEVOTHYROXINE SODIUM 50 MCG: 50 TABLET ORAL at 06:31

## 2020-05-11 NOTE — PROGRESS NOTES
room with chair alarm on with supervision of the RN. Response to Treatment and Activity Tolerance  Pt tolerated treatment well  No adverse symptoms noted w/activity    Assessment   Patient was cooperative and well motivated to participate in the session. Patient needed regular rest breaks during the session. Patient needed CGA for functional transfers and gait training with increased distance covered today during ambulation. Recommending ECF and with PT upon discharge as patient functioning well below baseline, demonstrates good rehab potential and unable to return home due to limited safety awareness. Goals (all goals ongoing unless otherwise indicated)  To be met in 3 visits:  1). Demonstrate improved safety awareness with functional mobility requiring less overall cueing.      To be met in 6 visits:  1).  Supine to/from sit  Supervision to promote return to functional ADLs  2).  Sit to/from stand   Supervision to promote return to functional ADLs  3).  Gait: Ambulate 150 ft. with Supervision and use of LRAD (least restrictive assistive device) demonstrating improved gait pattern  4).  Tolerate B LE exercises 3 sets of 10-15 reps to improve strength   5).  Tolerated standing balance exercises with improved balance to [address]   Continue with plan of care. Time Coded Treatment Minutes: 40 minutes    Total Treatment Time: 40 minutes      Alfonso Avila MS PT, # [id number]      If patient discharges from this facility prior to next visit, this note will serve as the Discharge Summary.

## 2020-05-11 NOTE — PROGRESS NOTES
Progress Note    Admit Date:  5/5/2020    Subjective:  Ms. Johns Dose denies any concerns at this time. Objective:   No data found. Intake/Output Summary (Last 24 hours) at 5/11/2020 1443  Last data filed at 5/11/2020 1330  Gross per 24 hour   Intake 720 ml   Output --   Net 720 ml       Physical Exam:    Gen: No distress. Alert. Elderly  female, sitting in common area  Eyes: PERRL. No sclera icterus. No conjunctival injection. Resp: No accessory muscle use. No crackles. No wheezes. No rhonchi. CV: Regular rate. Regular rhythm. No murmur. No rub. No edema. GI: Soft, Non-tender. Non-distended. Normal bowel sounds. Skin: Warm and dry. No rash on exposed extremities. M/S: No cyanosis. No joint deformity. No clubbing. Generalized weakness  Neuro: Awake. Grossly nonfocal    Psych: Defer to psychiatry    Scheduled Meds:   carBAMazepine  100 mg Oral 2 times per day    risperiDONE  1 mg Oral 2 times per day    levothyroxine  50 mcg Oral Daily    melatonin  6 mg Oral Daily    amLODIPine  5 mg Oral Daily    escitalopram  10 mg Oral Daily       PRN Meds:  hydrALAZINE, acetaminophen, LORazepam **OR** LORazepam, haloperidol lactate **OR** haloperidol, traZODone, benztropine mesylate, magnesium hydroxide, aluminum & magnesium hydroxide-simethicone, ondansetron    CULTURES  None     RADIOLOGY  None    Assessment/Plan:    Alzheimer's Dementia with Behavioral Disturbance  - cont mgmt per psychiatry team    HTN  - controlled  - recently admitted for hypertensive emergency, see H&P from admission  - cont Norvasc 5 Mg  - monitor     Hypothyroidism  - home dose of synthroid 50 mcg     No medical concerns at this time. Geriatric BHI may contact this provider should any concerns arise.     Vicky Regalado PA-C 2:46 PM 5/11/2020

## 2020-05-11 NOTE — BH NOTE
CHELA received a call back from [employer] pass at [facility]. SW made her aware that the pt will likely discharge Wednesday or Thursday of this week. SW shared that the provider reported about a 50% reduction in behaviors at this time. Helen asked SW if she spoke with son and explained he is in need of a \"compentency letter\" so he can proceed with paying her bills. SW will follow up with pt's son to get more information.            NAVEED Velazquez

## 2020-05-11 NOTE — PROGRESS NOTES
Department of Psychiatry  Attending Progress Note    Admission Date:    5/5/2020    Chief complaint / Reason for Admission:  Aggressive and psychotic behavioral disturbance    Patient's chart was reviewed, case was discussed with nursing/OT/RT staff, and collaborated with  about the treatment plan. SUBJECTIVE:   Over last 24 hours:  Behavioral outbursts: No  Non-aggressive behavioral disturbance: Yes  Medication compliant: Yes  Need for seclusion/restraints: No  Sleeping adequately: Yes  Appetite adequate: No  Attending groups: Occasional    Reviewed charting over weekend. Pt was acutely aggressive on Saturday and required IM medications and seclusion. She was not aggressive yesterday, but remained irritable and was difficult to engage by staff. This morning she appears calmer, though somwhat sedated/drowsy. Seated in day area dozing. Pt was superficially cooperative with me, but quite impoverished and perseverative. RN made me aware that pt's  wished to speak to me about pts case. Called and spoke with son for about 10 minutes. He had questions about diagnosis, prognosis and treatment plan, all of which were answered.     Progressing overall: Slight improvement  Suicidal ideation: did not voice  Homicidal ideation: did not voice  Medication side effects: Some sedation    ROS: Patient has new complaints: no    Current Medications Ordered:   carBAMazepine  100 mg Oral 2 times per day    risperiDONE  1 mg Oral 2 times per day    levothyroxine  50 mcg Oral Daily    melatonin  6 mg Oral Daily    amLODIPine  5 mg Oral Daily    escitalopram  10 mg Oral Daily      PRN Meds: hydrALAZINE, acetaminophen, LORazepam **OR** LORazepam, haloperidol lactate **OR** haloperidol, traZODone, benztropine mesylate, magnesium hydroxide, aluminum & magnesium hydroxide-simethicone, ondansetron     Objective:     PE:    /63   Pulse 94   Temp 96.6 °F (35.9 °C) (Oral)   Resp 18   SpO2 93%

## 2020-05-11 NOTE — PROGRESS NOTES
Occupational Therapy Daily Treatment Note    Unit: [facility]  Date:  5/11/2020  Patient Name:    Lit Jalloh  Admitting diagnosis:  Severe major neurocognitive disorder due to Alzheimer's disease with behavioral disturbance ([facility] [G30.9, F02.81]  Admit Date:  5/5/2020  Precautions/Restrictions:  fall risk, general BHI precautions    History of Present Illness: Patient was sent to the ED from her nursing home for reported aggressive behavioral disturbance.  She was found to have a UTI and initially sent back to her NH, but then again sent to the ED for ongoing aggression. Discharge Recommendations: SNF  DME needs for discharge: defer to facility       Therapy recommendations for staff:   Assist of 1 (minimal assist) with use of rolling walker (RW) for all ambulation within community room    AM-PAC Score: AM-PAC Inpatient Daily Activity Raw Score: 17  Home Health S4 Level: NA       Treatment Time:  16:30-16:01  Treatment number:  2   Total Treatment Time:   31 minutes      Subjective:  Patient sitting in community room and agreeable to treatment. Patient more talkative and eager to participate in activities. Pain   No  Rating: NA  Location:  Pain Medicine Status: Denies need      Bed Mobility:   Supine to Sit:  Not Tested  Sit to Supine:  Not Tested  Rolling:           Not Tested  Scooting:        CGA    Transfer Training:   Sit to stand:   Min A  Stand to sit:  Min A  Bed to Chair:  Not Tested  Bed to [facility]:   Not Tested  Standard toilet:   CGA     Patient completing functional mobility around community room with min A for management of RW.      Activity Tolerance   Pt completed therapy session with No adverse symptoms noted w/activity  SpO2:   HR:   BP:     ADL Training:   Upper body dressing:  Not Tested  Upper body bathing:  Not Tested  Lower body dressing:  Not Tested  Lower body bathing:  Not Tested  Toileting:   CGA for pericare and management of lower body clothing   Grooming/Hygiene:  Min A for balance while brushing hair and teeth at sink. Patient posteriorly leaning during grooming tasks. Self-feeding:   setup     Therapeutic Exercise:   N/A- performed during participation in ADLs    Patient Education:   Role of OT  Recommendations for DC  Safe RW use/hand placement    Positioning Needs:   Sitting in community room and eating dinner. Chair alarm on    Family Present:  No    Assessment: Patient demonstrated significant improvements in behavior, following directions, and participation this date. Patient smiling and laughing at therapist. Patient continues to have decreased balance and requires intermittent min A to prevent falls. GOALS  To be met in 3 Visits:  1). Bed to toilet/BSC: SBA     To be met in 5 Visits:  1). Supine to/from Sit:             Supervision  2). Upper Body Bathing:         SBA  3). Lower Body Bathing:         SBA  4). Upper Body Dressing:       SBA  5). Lower Body Dressing:       SBA  6).  Pt to demonstrate UE exs x 15 reps with minimal cues      Plan: cont with [facility], OTR/L #[id number]      If patient discharges from this facility prior to next visit, this note will serve as the Discharge Summary

## 2020-05-11 NOTE — BH NOTE
CTRS assisted pt with video calling her granddaughter and grand babies for positive emotional support. Pt positively socialized and reminisced with her support system. Pt was observing smiling throughout video call. Pt reported enjoyment of activity.     CATALINO James

## 2020-05-11 NOTE — BH NOTE
CHELA received a call from STYLHUNT ([phone]) at pt's facility, [facility], stating that pt will go to their memory unit upon discharge instead of returning to [place]. Helen asked for an update and date of anticipated D/C.  CHELA agreed to call her back after treatment team.           NAVEED Celeste

## 2020-05-12 PROCEDURE — 1240000000 HC EMOTIONAL WELLNESS R&B

## 2020-05-12 PROCEDURE — 6370000000 HC RX 637 (ALT 250 FOR IP): Performed by: NURSE PRACTITIONER

## 2020-05-12 PROCEDURE — 6370000000 HC RX 637 (ALT 250 FOR IP): Performed by: INTERNAL MEDICINE

## 2020-05-12 PROCEDURE — 99232 SBSQ HOSP IP/OBS MODERATE 35: CPT | Performed by: PSYCHIATRY & NEUROLOGY

## 2020-05-12 PROCEDURE — 99231 SBSQ HOSP IP/OBS SF/LOW 25: CPT | Performed by: PHYSICIAN ASSISTANT

## 2020-05-12 PROCEDURE — 6370000000 HC RX 637 (ALT 250 FOR IP): Performed by: PSYCHIATRY & NEUROLOGY

## 2020-05-12 RX ORDER — RISPERIDONE 1 MG/1
1 TABLET, ORALLY DISINTEGRATING ORAL
Status: DISCONTINUED | OUTPATIENT
Start: 2020-05-12 | End: 2020-05-15 | Stop reason: HOSPADM

## 2020-05-12 RX ORDER — RISPERIDONE 0.5 MG/1
0.5 TABLET, ORALLY DISINTEGRATING ORAL DAILY
Status: DISCONTINUED | OUTPATIENT
Start: 2020-05-13 | End: 2020-05-15 | Stop reason: HOSPADM

## 2020-05-12 RX ADMIN — CARBAMAZEPINE 100 MG: 200 TABLET ORAL at 09:38

## 2020-05-12 RX ADMIN — AMLODIPINE BESYLATE 5 MG: 5 TABLET ORAL at 09:38

## 2020-05-12 RX ADMIN — MELATONIN TAB 3 MG 6 MG: 3 TAB at 15:30

## 2020-05-12 RX ADMIN — RISPERIDONE 1 MG: 1 TABLET, ORALLY DISINTEGRATING ORAL at 09:38

## 2020-05-12 RX ADMIN — RISPERIDONE 1 MG: 1 TABLET, ORALLY DISINTEGRATING ORAL at 15:30

## 2020-05-12 RX ADMIN — CARBAMAZEPINE 100 MG: 200 TABLET ORAL at 15:30

## 2020-05-12 RX ADMIN — LEVOTHYROXINE SODIUM 50 MCG: 50 TABLET ORAL at 07:17

## 2020-05-12 RX ADMIN — ESCITALOPRAM OXALATE 10 MG: 10 TABLET ORAL at 09:38

## 2020-05-12 RX ADMIN — TRAZODONE HYDROCHLORIDE 25 MG: 50 TABLET ORAL at 21:02

## 2020-05-12 NOTE — PROGRESS NOTES
Nutrition Assessment (Low Risk)    Type and Reason for Visit: Initial(LOS x 7 days )    Nutrition Recommendations:   1. Continue general diet order - please continue to document meal intake % in flow sheets for each meal.   2. Continue Ensure Enlive with meals - please document ONS intake % in flow sheets for each occurrence. 3. Please confirm patient's height and obtain an actual, current weight for this patient. 4. Monitor bowel function, mental status, and weight trends. Nutrition Assessment:  Patient assessed for nutritional risk. Deemed to be at low risk at this time. Will continue to monitor for changes in status.  patient was nutritionally compromised upon admission AEB decreased appetite/po intake and agitation + violent behaviors PTA, however, she has greatly improved from a nutritional standpoint AEB > 50% of meals consumed x past 3 days of admission + she is consuming ONS and her mental status/behaviors have improved; patient is no longer at risk for further compromise; will continue general diet order + Ensure Enlive with meals     Malnutrition Assessment:  · Malnutrition Status: No malnutrition    Nutrition Risk Level   Risk Level: Low    Nutrition Diagnosis:   · Problem: No nutrition diagnosis at this time      Nutrition Intervention:  Food and/or Delivery: Continue current diet, Continue current ONS  Nutrition Education/Counseling/Coordination of Care:  Continued Inpatient Monitoring, Coordination of Care, Coordination of Community Care      Electronically signed by Salma Lee RD, LD on 5/12/20 at 11:44 AM EDT    Contact Number: [phone]

## 2020-05-12 NOTE — BH NOTE
[facility] Interdisciplinary Treatment Plan Note     Review Date & Time: 5/12/2020 1040    Patient was not in treatment team.    Admission Type:        Reason for admission:  Reason for Admission: Pt increasingly combative at [employer] . Declining for past month. Refusing meds. Estimated Length of Stay Update:  2-3 days   Estimated Discharge Date Update: 5/14-5/15    PATIENT STRENGTHS:  Patient Strengths:Strengths: Positive Support  Patient Strengths and Limitations:Limitations: Multiple barriers to leisure interests, Perceives need for assistance with self-care  Addictive Behavior:Addictive Behavior  In the past 3 months, have you felt or has someone told you that you have a problem with:  : None  Do you have a history of Chemical Use?: No  Do you have a history of Alcohol Use?: No  Do you have a history of Street Drug Abuse?: No  Histroy of Prescripton Drug Abuse?: No  Medical Problems:   Past Medical History:   Diagnosis Date    Alzheimer disease ([facility]     Dementia ([facility]        Risk:  Fall RiskTotal: 131  Kailash Scale Kailash Scale Score: 18  BVC Total: 1  Change in scores no.  Changes to plan of Care  no    Status EXAM:   Status and Exam  Normal: No  Facial Expression: Brightened  Affect: Incongruent  Level of Consciousness: Confused  Mood:Normal: No  Mood: Anxious  Motor Activity:Normal: No  Motor Activity: Decreased  Interview Behavior: Cooperative, Impulsive  Preception: [place] to Person  Attention:Normal: No  Attention: Distractible, Unable to Concentrate  Thought Processes: Perseveration  Thought Content:Normal: No  Thought Content: Preoccupations, Poverty of Content  Hallucinations: None(No RTIS noted)  Delusions: No  Delusions: Persecution  Memory:Normal: No  Memory: Poor Recent, Poor Remote  Insight and Judgment: No  Insight and Judgment: Poor Insight, Poor Judgment  Present Suicidal Ideation: No  Present Homicidal Ideation: No    Daily Assessment Last Entry:   Daily

## 2020-05-12 NOTE — PROGRESS NOTES
per progress notes from 5/2/20-5/3/20  SpO2 94%   BMI 21.08 kg/m²       Motor / Gait: psychomotor retardation, gait deferred    Mental Status Examination:    Appearance: WF, appears stated age, seated in dayroom, wearing hospital gown, poor grooming and hygiene   Behavior/Attitude toward examiner: Cooperative and pleasant this AM, though drowsy  Speech:  Nml volume, and tone, overall impoverished  Mood:  \"Okay, but sleepy\"  Affect:  Constricted  Thought processes:  Impoverished and perseverative  Thought Content: Denies SI, denies HI, did not voice paranoid delusions during interaction, but did last night, +confabulation  Perceptions: Denies AVH, not actively RTIS  Attention: Impaired  Abstraction: [place]  Cognition: Severely impaired  Insight: Poor insight   Judgment: Impaired judgment      LAB: Reviewed labs from last 24 hours      Dx:   Diagnoses:   Primary Psychiatric (DSM V) Diagnosis: Major neurocognitive disorder due to Alzheimer's Disease, moderate, with behavioral disturbance  Secondary Psychiatric (DSM V) Diagnoses: None  Chemical Dependency Diagnoses: None  Active Medical Diagnoses: New onset HTN     All conditions detailed above are being treated while patient is hospitalized.      Tx plan: Generally: prevent self injury/aggression, stabilize mood/anxiety/psychotic/behavioral disturbance, establish/maintain aftercare, increase coping mechanisms, improve medication compliance.  All conditions present on admission are being treated while pt is hospitalized.      Legal Status: Voluntary via HCPOA     Primary Psychiatric Issues:  1.   Dementia with behavioral disturbance:  -Continue escitalopram 10 mg daily  -Continue melatonin 6 mg but move up to 1600  -Decrease risperidone to 0.5 mg daily and 1 mg q1600 related to AM sedation.  -Start carbamazepine 100 mg at 0900 and 1600.     Chemical Dependency Issues:  No issues     Function:  -Consult physical therapy  -Consult occupational therapy  -Falls

## 2020-05-12 NOTE — BH NOTE
CTRS assisted pt with video calling her granddaughter and grand babies and son for positive emotional support. Pt positively socialized and reminisced with her support system. Pt was observing smiling throughout video call.  Pt reported enjoyment of activity.     [employer], [address]

## 2020-05-13 PROCEDURE — 6370000000 HC RX 637 (ALT 250 FOR IP): Performed by: PSYCHIATRY & NEUROLOGY

## 2020-05-13 PROCEDURE — 1240000000 HC EMOTIONAL WELLNESS R&B

## 2020-05-13 PROCEDURE — 99231 SBSQ HOSP IP/OBS SF/LOW 25: CPT | Performed by: PHYSICIAN ASSISTANT

## 2020-05-13 PROCEDURE — 6370000000 HC RX 637 (ALT 250 FOR IP): Performed by: INTERNAL MEDICINE

## 2020-05-13 PROCEDURE — 6370000000 HC RX 637 (ALT 250 FOR IP): Performed by: NURSE PRACTITIONER

## 2020-05-13 PROCEDURE — 99232 SBSQ HOSP IP/OBS MODERATE 35: CPT | Performed by: PSYCHIATRY & NEUROLOGY

## 2020-05-13 RX ADMIN — RISPERIDONE 0.5 MG: 0.5 TABLET, ORALLY DISINTEGRATING ORAL at 09:32

## 2020-05-13 RX ADMIN — RISPERIDONE 1 MG: 1 TABLET, ORALLY DISINTEGRATING ORAL at 16:46

## 2020-05-13 RX ADMIN — CARBAMAZEPINE 100 MG: 200 TABLET ORAL at 16:45

## 2020-05-13 RX ADMIN — AMLODIPINE BESYLATE 5 MG: 5 TABLET ORAL at 09:29

## 2020-05-13 RX ADMIN — ESCITALOPRAM OXALATE 10 MG: 10 TABLET ORAL at 09:32

## 2020-05-13 RX ADMIN — MELATONIN TAB 3 MG 6 MG: 3 TAB at 16:45

## 2020-05-13 RX ADMIN — LEVOTHYROXINE SODIUM 50 MCG: 50 TABLET ORAL at 06:26

## 2020-05-13 RX ADMIN — CARBAMAZEPINE 100 MG: 200 TABLET ORAL at 09:32

## 2020-05-13 NOTE — PLAN OF CARE
Problem: Falls - Risk of:  Goal: Will remain free from falls  Description: Will remain free from falls  Outcome: Met This Shift  Note: Better able to follow directions. Using rolling walker appropriate. Has chair alarm and bed alarm to ensure fall safety. Problem: Anger Management/Homicidal Ideation:  Goal: Able to display appropriate communication and problem solving  Description: Able to display appropriate communication and problem solving  Outcome: Ongoing  Note: Affect flat tho does make eye contact. Attended group with min participation. Medication compliant.

## 2020-05-13 NOTE — GROUP NOTE
Group Therapy Note    Date: 5/13/2020    Group Start Time: 1030  Group End Time: 1130  Group Topic: Recreational    [address]uli[address] 79        Group Therapy Note    Attendees: 6    Patient's Goal: to engage in playing Crazy 8's to promote socialization, friendly banter, cognitive skills, memory recall, and mood improvement. Notes: Erica Song actively engaged in group activity. Erica Song brightened appropriately for approximately the first 45 minutes of group. Erica Song appeared excited when winning the card game twice in a row. Erica Song appeared to become sad and upset after losing the third round of [employer] 8's. Erica Song fixated on going home and was not redirectable back to activity. Erica Song left group and did not return. Status After Intervention:  Unchanged    Participation Level:  Active Listener and Interactive    Participation Quality: Appropriate and Attentive      Speech:  normal      Thought Process/Content: Confused at times      Affective Functioning: Constricted/Restricted      Mood: anxious, depressed and happy, labile      Level of consciousness:  Alert, Attentive and Preoccupied (at the end of group)      Response to Learning: Capable of insight, Able to change behavior and Progressing to goal      Endings: None Reported    Modes of Intervention: Education, Support, Socialization, Exploration, Clarifying, Problem-solving and Activity      Discipline Responsible: Psychoeducational Specialist      Signature:  Mitzi Lara, [address]

## 2020-05-13 NOTE — PROGRESS NOTES
Department of Psychiatry  Attending Progress Note    Admission Date:    5/5/2020    Chief complaint / Reason for Admission:  Aggressive and psychotic behavioral disturbance    Patient's chart was reviewed, case was discussed with nursing/OT/RT staff, and collaborated with  about the treatment plan. SUBJECTIVE:   Over last 24 hours:  Behavioral outbursts: No  Non-aggressive behavioral disturbance: No  Medication compliant: Yes  Need for seclusion/restraints: No  Sleeping adequately: Yes  Appetite adequate: Yes  Attending groups: Occasional    Pt had a really good day yesterday. She exhibited no paranoia, significant mood lability, or aggression through the entire day. Required no PRN medications. She was cooperative with care with the exception of refusing a shower. Appetite improved. Pt continues to be more cooperative with assessments, and will consistently speak with this provider and answer questions. She has been enjoying talking to her grandchildren via skype on iPad. Pt did not appear sedated today.     Progressing overall: Improving  Suicidal ideation: did not voice  Homicidal ideation: did not voice  Medication side effects: Not sedated today    ROS: Patient has new complaints: no    Current Medications Ordered:   risperiDONE  0.5 mg Oral Daily    risperiDONE  1 mg Oral Daily    carBAMazepine  100 mg Oral 2 times per day    levothyroxine  50 mcg Oral Daily    melatonin  6 mg Oral Daily    amLODIPine  5 mg Oral Daily    escitalopram  10 mg Oral Daily      PRN Meds: hydrALAZINE, acetaminophen, LORazepam **OR** LORazepam, haloperidol lactate **OR** haloperidol, traZODone, benztropine mesylate, magnesium hydroxide, aluminum & magnesium hydroxide-simethicone, ondansetron     Objective:     PE:    BP (!) 156/70   Pulse 69   Temp 96.9 °F (36.1 °C) (Oral)   Resp 17   Ht 5' 3\" (1.6 m)   Wt 119 lb (54 kg) Comment: per progress notes from 5/2/20-5/3/20  SpO2 97%   BMI 21.08 kg/m² Motor / Gait: No psychomotor abnml, gait aided by walker, slow    Mental Status Examination:    Appearance: WF, appears stated age, seated in dayroom, wearing hospital gown, poor grooming and hygiene   Behavior/Attitude toward examiner: Cooperative and pleasant this AM, not drowsy today  Speech:  Nml volume, and tone, overall impoverished  Mood: \"All right\"  Affect:  Constricted  Thought processes:  Impoverished and perseverative  Thought Content: Denies SI, denies HI, no paranoia voiced, +confabulation  Perceptions: Denies AVH, not actively RTIS  Attention: Impaired  Abstraction: [place]  Cognition: Severely impaired  Insight: Poor insight   Judgment: Impaired judgment      LAB: Reviewed labs from last 24 hours      Dx:   Diagnoses:   Primary Psychiatric (DSM V) Diagnosis: Major neurocognitive disorder due to Alzheimer's Disease, moderate, with behavioral disturbance  Secondary Psychiatric (DSM V) Diagnoses: None  Chemical Dependency Diagnoses: None  Active Medical Diagnoses: New onset HTN     All conditions detailed above are being treated while patient is hospitalized.      Tx plan: Generally: prevent self injury/aggression, stabilize mood/anxiety/psychotic/behavioral disturbance, establish/maintain aftercare, increase coping mechanisms, improve medication compliance.  All conditions present on admission are being treated while pt is hospitalized.      Legal Status: Voluntary via HCPOA     Primary Psychiatric Issues:  1. Dementia with behavioral disturbance:  -Continue escitalopram 10 mg daily  -Continue melatonin 6 mg but move up to 1600  -Decreased risperidone to 0.5 mg daily and 1 mg q1600 related to AM sedation on 5/12.  -continue carbamazepine 100 mg at 0900 and 1600.     Chemical Dependency Issues:  No issues     Function:  -Consult physical therapy  -Consult occupational therapy  -Falls precautions     Medical Problems:  Internal medicine has been consulted. Appreciate recs.     1.  HTN:  Continue amlodipine 5 mg daily and hydralazine prn     2. Asymptomatic bacteruria:  Discussed with IM. Pt without symptoms and low colony count. Will not need abx.     Code Status: DNR-CC     Disposition:    -Housing: LTC facility  -Current outpatient follow-up: Facility psychiatrist     Estimated length of stay: 5 to 7 days. Probable d/c by end of week.     Criteria for Discharge:  Not psychotic, not homicidal, not suicidal, behavioral disturbance under control, sleeping well, mood improved/stable, eating well, aftercare arranged. Total face to face time with patient was 30 minutes and more than 50 % of that time was spent counseling the patient on their symptoms, treatment and expected goals.     Umu Mccabe MD  Staff Psychiatrist

## 2020-05-14 PROCEDURE — 6370000000 HC RX 637 (ALT 250 FOR IP): Performed by: INTERNAL MEDICINE

## 2020-05-14 PROCEDURE — 97535 SELF CARE MNGMENT TRAINING: CPT

## 2020-05-14 PROCEDURE — 6370000000 HC RX 637 (ALT 250 FOR IP): Performed by: NURSE PRACTITIONER

## 2020-05-14 PROCEDURE — 1240000000 HC EMOTIONAL WELLNESS R&B

## 2020-05-14 PROCEDURE — 99232 SBSQ HOSP IP/OBS MODERATE 35: CPT | Performed by: PSYCHIATRY & NEUROLOGY

## 2020-05-14 PROCEDURE — 97110 THERAPEUTIC EXERCISES: CPT

## 2020-05-14 PROCEDURE — 99231 SBSQ HOSP IP/OBS SF/LOW 25: CPT | Performed by: PHYSICIAN ASSISTANT

## 2020-05-14 PROCEDURE — 6370000000 HC RX 637 (ALT 250 FOR IP): Performed by: PSYCHIATRY & NEUROLOGY

## 2020-05-14 RX ADMIN — RISPERIDONE 0.5 MG: 0.5 TABLET, ORALLY DISINTEGRATING ORAL at 08:33

## 2020-05-14 RX ADMIN — MELATONIN TAB 3 MG 6 MG: 3 TAB at 17:00

## 2020-05-14 RX ADMIN — RISPERIDONE 1 MG: 1 TABLET, ORALLY DISINTEGRATING ORAL at 17:00

## 2020-05-14 RX ADMIN — AMLODIPINE BESYLATE 5 MG: 5 TABLET ORAL at 08:32

## 2020-05-14 RX ADMIN — CARBAMAZEPINE 100 MG: 200 TABLET ORAL at 17:00

## 2020-05-14 RX ADMIN — CARBAMAZEPINE 100 MG: 200 TABLET ORAL at 08:32

## 2020-05-14 RX ADMIN — LEVOTHYROXINE SODIUM 50 MCG: 50 TABLET ORAL at 08:32

## 2020-05-14 RX ADMIN — ESCITALOPRAM OXALATE 10 MG: 10 TABLET ORAL at 08:32

## 2020-05-14 NOTE — BH NOTE
CHELA called the nurse, Ambar Christine, to inquire if a negative COVID test will allow pt to avoid quarantine for 14 days. She   Cecy Ruggiero stated she has not heard back from administration; so she will have to call CHELA back. CHELA made her aware the pt will be discharging back to the facility tomorrow.            NAVEED Velazquez

## 2020-05-14 NOTE — GROUP NOTE
Group Therapy Note    Date: 5/14/2020    Group Start Time: 1229  Group End Time: 3188  Group Topic: Psychoeducation    Julián Olivier        Group Therapy Note    Attendees: 4    Patient's Goal: to engage in watching virtual penguin and elephant zoo tour to [employer] learning, relaxation, reminiscing, and mood improvement. Notes: Tereso Charles actively engaged in group activity. Arti positively socialized with peers and reminisced. Tereso Charles appeared excited and repititively reported to peers elephants live outside and she has seen them in the yard. Tereso Charles referred to peers as her family members. Tereso Charles was not able to be re-oriented. Tereso Charles expressed enjoyment of activity. Status After Intervention:  Unchanged    Participation Level:  Active Listener and Interactive    Participation Quality: Appropriate, Attentive and Sharing      Speech:  normal      Thought Process/Content: Confused      Affective Functioning: Congruent      Mood: euthymic      Level of consciousness:  Alert      Response to Learning: Able to change behavior and Progressing to goal      Endings: None Reported    Modes of Intervention: Education, Support, Socialization, Exploration, Activity and Media      Discipline Responsible: Psychoeducational Specialist      Signature:  Michele Carter [place]

## 2020-05-14 NOTE — PROGRESS NOTES
CGA for balance during pericare and management of lower body clothing   Grooming/Hygiene:  SBA while brushing hair at sink. Self-feeding:   Not tested     Therapeutic Exercise:   Shoulder flex/ext:  x15  Bench press: x15  Shoulder shrugs: x15  Bicep curls: x15  Horizontal abd/add: x15   Overhead press: x15    Patient Education:   Role of OT  Recommendations for DC  Safe RW use/hand placement   UE there ex    Positioning Needs:   Sitting in community room. Chair alarm on    Family Present:  No    Assessment: Pt tolerated session well today, including UE there ex. Patient continues to have decreased balance and requires intermittent min A to prevent falls. Pt should continue to benefit from skilled OT tx in order to maximize independence so that she may require the least amount of assistance upon her return home (SNF). GOALS  To be met in 3 Visits:  1). Bed to toilet/BSC: SBA     To be met in 5 Visits:  1). Supine to/from Sit:             Supervision  (Goal met 5/14/20)  2). Upper Body Bathing:         SBA  3). Lower Body Bathing:         SBA  4). Upper Body Dressing:       SBA  5). Lower Body Dressing:       SBA  6).  Pt to demonstrate UE exs x 15 reps with minimal cues (Goal met 5/14/20)      Plan: cont with [address] [place], OTR/L  #[id number]        If patient discharges from this facility prior to next visit, this note will serve as the Discharge Summary

## 2020-05-14 NOTE — BH NOTE
CTRS attempted to offer Noemi Elena the opportunity to talk to her support system on her tablet with CTRS monitoring. Noemi Elena appeared to be resting and did not rouse at time of invite. Staff will reevaluate support system video chat session tomorrow.     Stephon Pop, CATALINO

## 2020-05-14 NOTE — PROGRESS NOTES
medical concerns at this time. [facility] may contact this provider should any concerns arise.     Hung Lopez PA-C 1:31 PM 5/14/2020

## 2020-05-15 VITALS
HEART RATE: 61 BPM | WEIGHT: 119 LBS | TEMPERATURE: 98.9 F | DIASTOLIC BLOOD PRESSURE: 67 MMHG | HEIGHT: 63 IN | OXYGEN SATURATION: 94 % | SYSTOLIC BLOOD PRESSURE: 136 MMHG | BODY MASS INDEX: 21.09 KG/M2 | RESPIRATION RATE: 16 BRPM

## 2020-05-15 PROCEDURE — 6370000000 HC RX 637 (ALT 250 FOR IP): Performed by: PSYCHIATRY & NEUROLOGY

## 2020-05-15 PROCEDURE — 6370000000 HC RX 637 (ALT 250 FOR IP): Performed by: INTERNAL MEDICINE

## 2020-05-15 PROCEDURE — 6370000000 HC RX 637 (ALT 250 FOR IP): Performed by: NURSE PRACTITIONER

## 2020-05-15 PROCEDURE — 5130000000 HC BRIDGE APPOINTMENT

## 2020-05-15 PROCEDURE — 99231 SBSQ HOSP IP/OBS SF/LOW 25: CPT | Performed by: PHYSICIAN ASSISTANT

## 2020-05-15 PROCEDURE — 99239 HOSP IP/OBS DSCHRG MGMT >30: CPT | Performed by: PSYCHIATRY & NEUROLOGY

## 2020-05-15 RX ORDER — TRAZODONE HYDROCHLORIDE 50 MG/1
25 TABLET ORAL NIGHTLY PRN
Qty: 15 TABLET | Refills: 0 | Status: SHIPPED | OUTPATIENT
Start: 2020-05-15

## 2020-05-15 RX ORDER — CARBAMAZEPINE 200 MG/1
TABLET ORAL
Qty: 30 TABLET | Refills: 0 | Status: SHIPPED | OUTPATIENT
Start: 2020-05-15

## 2020-05-15 RX ORDER — RISPERIDONE 0.5 MG/1
TABLET, FILM COATED ORAL
Qty: 90 TABLET | Refills: 0 | Status: SHIPPED | OUTPATIENT
Start: 2020-05-15

## 2020-05-15 RX ORDER — LANOLIN ALCOHOL/MO/W.PET/CERES
CREAM (GRAM) TOPICAL
Qty: 60 TABLET | Refills: 0 | Status: SHIPPED | OUTPATIENT
Start: 2020-05-15

## 2020-05-15 RX ORDER — AMLODIPINE BESYLATE 5 MG/1
5 TABLET ORAL DAILY
Qty: 30 TABLET | Refills: 0 | Status: SHIPPED | OUTPATIENT
Start: 2020-05-16

## 2020-05-15 RX ADMIN — LEVOTHYROXINE SODIUM 50 MCG: 50 TABLET ORAL at 06:34

## 2020-05-15 RX ADMIN — ESCITALOPRAM OXALATE 10 MG: 10 TABLET ORAL at 10:32

## 2020-05-15 RX ADMIN — RISPERIDONE 0.5 MG: 0.5 TABLET, ORALLY DISINTEGRATING ORAL at 10:32

## 2020-05-15 RX ADMIN — CARBAMAZEPINE 100 MG: 200 TABLET ORAL at 10:32

## 2020-05-15 RX ADMIN — AMLODIPINE BESYLATE 5 MG: 5 TABLET ORAL at 10:35
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
## 2020-05-15 NOTE — BH NOTE
CHELA called the pt's son, Loly Valverde, and made him aware that the pt will be discharging today at 1:00pm. SW let him know the statement of expert evaluation will be sent with her so they can put it in  her file until he can pick it up. In addition, CHELA ensured that she would follow up with nursing staff to make sure they send her tablet with her.              NAVEED Velazquez

## 2020-05-15 NOTE — PLAN OF CARE
1:1 Assessment completed, see flow sheet. Patient is pleasantly confused and cooperative. Patient up in chair in TV room, dressed in hospital attire. No abnormal behaviors. Appetite and fluid intake good for snack. Assistance required with hygiene. Vital signs are stable. Safety checks  Q 15 minutes throughout the shift. Fall precautions in place, chair alarm on, nonskid foot wear applied, walker within reach. Will continue to monitor.

## 2020-05-15 NOTE — BH NOTE
[facility]  Discharge Note    Pt discharged with followings belongings:   Dentures: Jenna Ilana)  Vision - Corrective Lenses: Glasses  Hearing Aid: None  Jewelry: Ring  Body Piercings Removed: N/A  Clothing: Undergarments (Comment), Shirt, Footwear, Pants, Socks  Were All Patient Medications Collected?: Not Applicable  Other Valuables: ()   Valuables sent home with patient. Valuables retrieved from safe, Security envelope number:  n/a and returned to patient. Patient education on aftercare instructions: report called to unit RN  Information faxed to SNF by nursing Patient verbalize understanding of AVS:  Cognitively impaired .     Status EXAM upon discharge:  Status and Exam  Normal: No  Facial Expression: Brightened  Affect: Normal  Level of Consciousness: Confused  Mood:Normal: Yes  Mood: Anxious  Motor Activity:Normal: No  Motor Activity: Decreased  Interview Behavior: Cooperative  Preception: [place] to Person  Attention:Normal: No  Attention: Distractible, Unable to Concentrate  Thought Processes: (confused)  Thought Content:Normal: No  Thought Content: Poverty of Content  Hallucinations: None(no RTIS observed)  Delusions: No  Delusions: Persecution  Memory:Normal: No  Memory: Poor Recent, Poor Remote  Insight and Judgment: No  Insight and Judgment: Poor Judgment, Poor Insight  Present Suicidal Ideation: Other(See comment)(PATRICK, no s/s observed)  Present Homicidal Ideation: Other(See comment)(PATRICK, no s/s observed)      Metabolic Screening:    Lab Results   Component Value Date    LABA1C 5.6 05/04/2020       Lab Results   Component Value Date    CHOL 217 (H) 05/04/2020    CHOL 192 06/05/2012     Lab Results   Component Value Date    TRIG 67 05/04/2020    TRIG 62 06/05/2012     Lab Results   Component Value Date    HDL 63 (H) 05/04/2020    HDL 73 (H) 06/05/2012     No components found for: [facility] [place]  Lab Results   Component Value Date    LABVLDL 13 05/04/2020    LABVLDL 12 06/05/2012     Bridge Appointment completed: Reviewed Discharge Instructions with patient. Patient verbalizes understanding and agreement with the discharge plan using the teachback method.      Referral for Outpatient Tobacco Cessation Counseling, upon discharge (levi X if applicable and completed):    ( )  Hospital staff assisted patient to call Quit Line or faxed referral                                   during hospitalization                  ( )  Recognizing danger situations (included triggers and roadblocks), if not completed on admission                    ( )  Coping skills (new ways to manage stress, exercise, relaxation techniques, changing routine, distraction), if not completed on admission                                                           ( )  Basic information about quitting (benefits of quitting, techniques in how to quit, available resources, if not completed on admission  ( ) [facility]   ( ) Patient refused referral  ( ) Patient refused counseling  ( x ) Patient refused smoking cessation medication upon discharge    Vaccinations (levi X if applicable and completed):  ( x ) Patient states already received influenza vaccine elsewhere  ( ) Patient received influenza vaccine during this hospitalization  ( ) Patient refused influenza vaccine at this time      Feliz Bates RN

## 2020-06-03 ENCOUNTER — HOSPITAL ENCOUNTER (EMERGENCY)
Age: 85
Discharge: HOME | End: 2020-06-04
Attending: EMERGENCY MEDICINE
Payer: MEDICARE

## 2020-06-03 LAB
BASOPHILS ABSOLUTE: 0 K/UL (ref 0–0.2)
BASOPHILS RELATIVE PERCENT: 0.7 %
EOSINOPHILS ABSOLUTE: 0.1 K/UL (ref 0–0.6)
EOSINOPHILS RELATIVE PERCENT: 1.3 %
HCT VFR BLD CALC: 37.2 % (ref 36–48)
HEMOGLOBIN: 12.2 G/DL (ref 12–16)
LYMPHOCYTES ABSOLUTE: 0.9 K/UL (ref 1–5.1)
LYMPHOCYTES RELATIVE PERCENT: 21.5 %
MCH RBC QN AUTO: 32.5 PG (ref 26–34)
MCHC RBC AUTO-ENTMCNC: 32.9 G/DL (ref 31–36)
MCV RBC AUTO: 98.8 FL (ref 80–100)
MONOCYTES ABSOLUTE: 0.5 K/UL (ref 0–1.3)
MONOCYTES RELATIVE PERCENT: 10.9 %
NEUTROPHILS ABSOLUTE: 2.9 K/UL (ref 1.7–7.7)
NEUTROPHILS RELATIVE PERCENT: 65.6 %
PDW BLD-RTO: 13.5 % (ref 12.4–15.4)
PLATELET # BLD: 189 K/UL (ref 135–450)
PMV BLD AUTO: 9.1 FL (ref 5–10.5)
RBC # BLD: 3.76 M/UL (ref 4–5.2)
WBC # BLD: 4.3 K/UL (ref 4–11)

## 2020-06-03 PROCEDURE — 80053 COMPREHEN METABOLIC PANEL: CPT

## 2020-06-03 PROCEDURE — G0480 DRUG TEST DEF 1-7 CLASSES: HCPCS

## 2020-06-03 PROCEDURE — 99283 EMERGENCY DEPT VISIT LOW MDM: CPT

## 2020-06-03 PROCEDURE — 85025 COMPLETE CBC W/AUTO DIFF WBC: CPT

## 2020-06-04 VITALS
HEART RATE: 86 BPM | DIASTOLIC BLOOD PRESSURE: 101 MMHG | TEMPERATURE: 98.2 F | OXYGEN SATURATION: 95 % | HEIGHT: 64 IN | WEIGHT: 129 LBS | BODY MASS INDEX: 22.02 KG/M2 | SYSTOLIC BLOOD PRESSURE: 185 MMHG | RESPIRATION RATE: 18 BRPM

## 2020-06-04 LAB
A/G RATIO: 1.5 (ref 1.1–2.2)
ACETAMINOPHEN LEVEL: <5 UG/ML (ref 10–30)
ALBUMIN SERPL-MCNC: 4 G/DL (ref 3.4–5)
ALP BLD-CCNC: 91 U/L (ref 40–129)
ALT SERPL-CCNC: 25 U/L (ref 10–40)
AMPHETAMINE SCREEN, URINE: NORMAL
ANION GAP SERPL CALCULATED.3IONS-SCNC: 12 MMOL/L (ref 3–16)
AST SERPL-CCNC: 27 U/L (ref 15–37)
BARBITURATE SCREEN URINE: NORMAL
BENZODIAZEPINE SCREEN, URINE: NORMAL
BILIRUB SERPL-MCNC: <0.2 MG/DL (ref 0–1)
BILIRUBIN URINE: NEGATIVE
BLOOD, URINE: NEGATIVE
BUN BLDV-MCNC: 15 MG/DL (ref 7–20)
CALCIUM SERPL-MCNC: 8.8 MG/DL (ref 8.3–10.6)
CANNABINOID SCREEN URINE: NORMAL
CHLORIDE BLD-SCNC: 96 MMOL/L (ref 99–110)
CLARITY: CLEAR
CO2: 24 MMOL/L (ref 21–32)
COCAINE METABOLITE SCREEN URINE: NORMAL
COLOR: YELLOW
CREAT SERPL-MCNC: 0.8 MG/DL (ref 0.6–1.2)
ETHANOL: NORMAL MG/DL (ref 0–0.08)
GFR AFRICAN AMERICAN: >60
GFR NON-AFRICAN AMERICAN: >60
GLOBULIN: 2.7 G/DL
GLUCOSE BLD-MCNC: 110 MG/DL (ref 70–99)
GLUCOSE URINE: NEGATIVE MG/DL
KETONES, URINE: NEGATIVE MG/DL
LEUKOCYTE ESTERASE, URINE: NEGATIVE
Lab: NORMAL
METHADONE SCREEN, URINE: NORMAL
MICROSCOPIC EXAMINATION: NORMAL
NITRITE, URINE: NEGATIVE
OPIATE SCREEN URINE: NORMAL
OXYCODONE URINE: NORMAL
PH UA: 7.5
PH UA: 7.5 (ref 5–8)
PHENCYCLIDINE SCREEN URINE: NORMAL
POTASSIUM SERPL-SCNC: 4 MMOL/L (ref 3.5–5.1)
PROPOXYPHENE SCREEN: NORMAL
PROTEIN UA: NEGATIVE MG/DL
SALICYLATE, SERUM: <0.3 MG/DL (ref 15–30)
SODIUM BLD-SCNC: 132 MMOL/L (ref 136–145)
SPECIFIC GRAVITY UA: 1.01 (ref 1–1.03)
TOTAL PROTEIN: 6.7 G/DL (ref 6.4–8.2)
URINE REFLEX TO CULTURE: NORMAL
URINE TYPE: NORMAL
UROBILINOGEN, URINE: 0.2 E.U./DL

## 2020-06-04 PROCEDURE — 6360000002 HC RX W HCPCS: Performed by: EMERGENCY MEDICINE

## 2020-06-04 PROCEDURE — 96372 THER/PROPH/DIAG INJ SC/IM: CPT

## 2020-06-04 PROCEDURE — 81003 URINALYSIS AUTO W/O SCOPE: CPT

## 2020-06-04 PROCEDURE — 6370000000 HC RX 637 (ALT 250 FOR IP): Performed by: EMERGENCY MEDICINE

## 2020-06-04 PROCEDURE — 80307 DRUG TEST PRSMV CHEM ANLYZR: CPT

## 2020-06-04 RX ORDER — AMLODIPINE BESYLATE 5 MG/1
5 TABLET ORAL ONCE
Status: COMPLETED | OUTPATIENT
Start: 2020-06-04 | End: 2020-06-04

## 2020-06-04 RX ORDER — LORAZEPAM 2 MG/ML
2 INJECTION INTRAMUSCULAR ONCE
Status: COMPLETED | OUTPATIENT
Start: 2020-06-04 | End: 2020-06-04

## 2020-06-04 RX ORDER — DIPHENHYDRAMINE HYDROCHLORIDE 50 MG/ML
25 INJECTION INTRAMUSCULAR; INTRAVENOUS ONCE
Status: COMPLETED | OUTPATIENT
Start: 2020-06-04 | End: 2020-06-04

## 2020-06-04 RX ADMIN — AMLODIPINE BESYLATE 5 MG: 5 TABLET ORAL at 04:23

## 2020-06-04 RX ADMIN — LORAZEPAM 2 MG: 2 INJECTION INTRAMUSCULAR; INTRAVENOUS at 02:27

## 2020-06-04 RX ADMIN — DIPHENHYDRAMINE HYDROCHLORIDE 25 MG: 50 INJECTION, SOLUTION INTRAMUSCULAR; INTRAVENOUS at 02:27

## 2020-06-04 NOTE — ED PROVIDER NOTES
Not on file     Attends Restoration service: Not on file     Active member of club or organization: Not on file     Attends meetings of clubs or organizations: Not on file     Relationship status: Not on file    Intimate partner violence     Fear of current or ex partner: Not on file     Emotionally abused: Not on file     Physically abused: Not on file     Forced sexual activity: Not on file   Other Topics Concern    Not on file   Social History Narrative    Not on file       Nursing notes reviewed. ED Triage Vitals [06/03/20 2339]   Enc Vitals Group      BP (!) 160/74      Pulse 62      Resp 16      Temp 98.2 °F (36.8 °C)      Temp Source Oral      SpO2 95 %      Weight 129 lb (58.5 kg)      Height 5' 4\" (1.626 m)      Head Circumference       Peak Flow       Pain Score       Pain Loc       Pain Edu? Excl. in [address]? GENERAL:    Awake, alert. Well developed, well nourished with no apparent distress. Nontoxic-appearing, non-ill-appearing. HENT:    Normocephalic, Atraumatic, no lacerations. No ENT exam due   EYES:    Conjunctiva normal,   Pupils equal round and reactive to light,   Extraocular movements normal.  NECK:    Trachea is midline. No stridor. No C-spine midline tenderness. CHEST:    Regular rate and regular rhythm, no murmurs/rubs/gallops,   normal S1/S2, chest wall non-tender. LUNGS:    No respiratory distress. No abdominal retractions, no sternal retractions. Clear to auscultation bilaterally, no wheezing, no rhochi, no rales  ABDOMEN:    Soft, non-tender, non-distended. No guarding. No rebound. EXTREMITIES:   Bilateral upper extremities in restraints, hands are not swollen     Moves extremities x4 with purpose. Normal range of motion, no edema,   no tenderness, no deformity,   distal pulses present and equal bilaterally. SKIN:    Warm, dry and intact. NEUROLOGIC:  Normal mental status. Moving all extremities to command.    Alert and oriented to self only  without focal motor deficit or gross sensory deficit. Normal speech. Strength 5/5 in bilateral upper and lower extremities.    PSYCHIATRIC:  anxious,   Agitated mood with congruent affect,   thoughts are nonlinear and disorganized      LABS and DIAGNOSTIC RESULTS    LABS  Results for orders placed or performed during the hospital encounter of 06/03/20   CBC auto differential   Result Value Ref Range    WBC 4.3 4.0 - 11.0 K/uL    RBC 3.76 (L) 4.00 - 5.20 M/uL    Hemoglobin 12.2 12.0 - 16.0 g/dL    Hematocrit 37.2 36.0 - 48.0 %    MCV 98.8 80.0 - 100.0 fL    MCH 32.5 26.0 - 34.0 pg    MCHC 32.9 31.0 - 36.0 g/dL    RDW 13.5 12.4 - 15.4 %    Platelets 028 176 - 497 K/uL    MPV 9.1 5.0 - 10.5 fL    Neutrophils % 65.6 %    Lymphocytes % 21.5 %    Monocytes % 10.9 %    Eosinophils % 1.3 %    Basophils % 0.7 %    Neutrophils Absolute 2.9 1.7 - 7.7 K/uL    Lymphocytes Absolute 0.9 (L) 1.0 - 5.1 K/uL    Monocytes Absolute 0.5 0.0 - 1.3 K/uL    Eosinophils Absolute 0.1 0.0 - 0.6 K/uL    Basophils Absolute 0.0 0.0 - 0.2 K/uL   Comprehensive metabolic panel   Result Value Ref Range    Sodium 132 (L) 136 - 145 mmol/L    Potassium 4.0 3.5 - 5.1 mmol/L    Chloride 96 (L) 99 - 110 mmol/L    CO2 24 21 - 32 mmol/L    Anion Gap 12 3 - 16    Glucose 110 (H) 70 - 99 mg/dL    BUN 15 7 - 20 mg/dL    CREATININE 0.8 0.6 - 1.2 mg/dL    GFR Non-African American >60 >60    GFR African American >60 >60    Calcium 8.8 8.3 - 10.6 mg/dL    Total Protein 6.7 6.4 - 8.2 g/dL    Alb 4.0 3.4 - 5.0 g/dL    Albumin/Globulin Ratio 1.5 1.1 - 2.2    Total Bilirubin <0.2 0.0 - 1.0 mg/dL    Alkaline Phosphatase 91 40 - 129 U/L    ALT 25 10 - 40 U/L    AST 27 15 - 37 U/L    Globulin 2.7 g/dL   Ethanol   Result Value Ref Range    Ethanol Lvl None Detected mg/dL   Salicylate   Result Value Ref Range    Salicylate, Serum <9.3 (L) 15.0 - 30.0 mg/dL   Acetaminophen level   Result Value Ref Range    Acetaminophen Level <5 (L) 10 - 30 ug/mL   Drug screen multi urine   Result Value Ref Range    Amphetamine Screen, Urine Neg Negative <1000ng/mL    Barbiturate Screen, Ur Neg Negative <200 ng/mL    Benzodiazepine Screen, Urine Neg Negative <200 ng/mL    Cannabinoid Scrn, Ur Neg Negative <50 ng/mL    Cocaine Metabolite Screen, Urine Neg Negative <300 ng/mL    Opiate Scrn, Ur Neg Negative <300 ng/mL    PCP Screen, Urine Neg Negative <25 ng/mL    Methadone Screen, Urine Neg Negative <300 ng/mL    Propoxyphene Scrn, Ur Neg Negative <300 ng/mL    Oxycodone Urine Neg Negative <100 ng/ml    pH, UA 7.5     Drug Screen Comment: see below    Urine, reflex to culture   Result Value Ref Range    Color, UA Yellow Straw/Yellow    Clarity, UA Clear Clear    Glucose, Ur Negative Negative mg/dL    Bilirubin Urine Negative Negative    Ketones, Urine Negative Negative mg/dL    Specific Gravity, UA 1.010 1.005 - 1.030    Blood, Urine Negative Negative    pH, UA 7.5 5.0 - 8.0    Protein, UA Negative Negative mg/dL    Urobilinogen, Urine 0.2 <2.0 E.U./dL    Nitrite, Urine Negative Negative    Leukocyte Esterase, Urine Negative Negative    Microscopic Examination Not Indicated     Urine Type NotGiven     Urine Reflex to Culture Not Indicated        PROCEDURES      MEDICAL DECISION MAKING    Procedures/interventions/images ordered for this visit  Orders Placed This Encounter   Procedures    CBC auto differential    Comprehensive metabolic panel    Ethanol    Salicylate    Acetaminophen level    Drug screen multi urine    Urine, reflex to culture    Straight cath    Restraints non-violent or non-self-destructive       Medications ordered for this visit  No orders of the defined types were placed in this encounter. ED course notes for this visit       I wore goggles, surgical mask, N95 mask and gloves when I evaluated the patient.         - Patient seen and evaluated in room 10    This patient presented to the ED at risk of harming themselves or others, meeting criteria to be placed

## 2020-06-04 NOTE — ED NOTES
Pt incontinent of urine. Pt linens and gown changed. Lap belt removed. Pt remains in bilateral soft wrist restraints.      Manuel Luna RN  06/04/20 [id number]
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
## 2021-09-28 NOTE — GROUP NOTE
Group Therapy Note    Date: 5/11/2020    Group Start Time: 1030  Group End Time: 1100  Group Topic: Cognitive Skills    [facility]        Group Therapy Note    Attendees: 3    Patient's Goal: to engage in playing CraAttune RTDy 8's with peers to increase socialization, improve mood, and improve overall quality of life. Notes: Sukhjinder Ty engaged in playing gShift Labsy 8's for approximately 15 minutes. Pt followed one-step directives easily, until pt became drowsy. Sukhjinder Ty began to fall asleep and when asked if she would like LEYLA to stop prompting her, she stated yes. Pt sat at the table and rested with her eyes closed for the remainder of group.      Status After Intervention:  Unchanged    Participation Level: Interactive    Participation Quality: Appropriate      Speech:  normal      Thought Process/Content: confused      Affective Functioning: Constricted/Restricted      Mood: tired      Level of consciousness:  Drowsy      Response to Learning: Progressing to goal      Endings: None Reported    Modes of Intervention: Education, Support, Socialization, Problem-solving and Activity      Discipline Responsible: Psychoeducational Specialist      Signature:  LEYLA [facility] for Tobacco Control website --- [email or web address] --- [email or web address]
